# Patient Record
Sex: MALE | Race: WHITE | NOT HISPANIC OR LATINO | Employment: FULL TIME | URBAN - METROPOLITAN AREA
[De-identification: names, ages, dates, MRNs, and addresses within clinical notes are randomized per-mention and may not be internally consistent; named-entity substitution may affect disease eponyms.]

---

## 2017-05-19 ENCOUNTER — HOSPITAL ENCOUNTER (EMERGENCY)
Facility: CLINIC | Age: 22
Discharge: HOME | End: 2017-05-19
Attending: EMERGENCY MEDICINE | Admitting: EMERGENCY MEDICINE
Payer: COMMERCIAL

## 2017-05-19 ENCOUNTER — APPOINTMENT (OUTPATIENT)
Dept: CT IMAGING | Facility: CLINIC | Age: 22
End: 2017-05-19
Attending: EMERGENCY MEDICINE
Payer: COMMERCIAL

## 2017-05-19 VITALS
RESPIRATION RATE: 24 BRPM | DIASTOLIC BLOOD PRESSURE: 79 MMHG | OXYGEN SATURATION: 95 % | SYSTOLIC BLOOD PRESSURE: 138 MMHG | WEIGHT: 114.2 LBS | TEMPERATURE: 97.8 F

## 2017-05-19 DIAGNOSIS — E86.0 DEHYDRATION: ICD-10-CM

## 2017-05-19 DIAGNOSIS — R10.84 ABDOMINAL PAIN, GENERALIZED: ICD-10-CM

## 2017-05-19 DIAGNOSIS — R11.2 NAUSEA AND VOMITING, INTRACTABILITY OF VOMITING NOT SPECIFIED, UNSPECIFIED VOMITING TYPE: ICD-10-CM

## 2017-05-19 LAB
ALBUMIN SERPL-MCNC: 5.4 G/DL (ref 3.4–5)
ALP SERPL-CCNC: 99 U/L (ref 40–150)
ALT SERPL W P-5'-P-CCNC: 30 U/L (ref 0–70)
ANION GAP SERPL CALCULATED.3IONS-SCNC: 13 MMOL/L (ref 3–14)
AST SERPL W P-5'-P-CCNC: 17 U/L (ref 0–45)
BASOPHILS # BLD AUTO: 0.1 10E9/L (ref 0–0.2)
BASOPHILS NFR BLD AUTO: 0.2 %
BILIRUB SERPL-MCNC: 1.6 MG/DL (ref 0.2–1.3)
BUN SERPL-MCNC: 18 MG/DL (ref 7–30)
CALCIUM SERPL-MCNC: 10.3 MG/DL (ref 8.5–10.1)
CHLORIDE SERPL-SCNC: 105 MMOL/L (ref 94–109)
CO2 SERPL-SCNC: 22 MMOL/L (ref 20–32)
CREAT SERPL-MCNC: 0.85 MG/DL (ref 0.66–1.25)
DIFFERENTIAL METHOD BLD: ABNORMAL
EOSINOPHIL # BLD AUTO: 0 10E9/L (ref 0–0.7)
EOSINOPHIL NFR BLD AUTO: 0 %
ERYTHROCYTE [DISTWIDTH] IN BLOOD BY AUTOMATED COUNT: 14.1 % (ref 10–15)
GFR SERPL CREATININE-BSD FRML MDRD: ABNORMAL ML/MIN/1.7M2
GLUCOSE SERPL-MCNC: 180 MG/DL (ref 70–99)
HCT VFR BLD AUTO: 52.3 % (ref 40–53)
HGB BLD-MCNC: 18.2 G/DL (ref 13.3–17.7)
IMM GRANULOCYTES # BLD: 0.1 10E9/L (ref 0–0.4)
IMM GRANULOCYTES NFR BLD: 0.4 %
LIPASE SERPL-CCNC: 79 U/L (ref 73–393)
LYMPHOCYTES # BLD AUTO: 0.7 10E9/L (ref 0.8–5.3)
LYMPHOCYTES NFR BLD AUTO: 2.9 %
MCH RBC QN AUTO: 33.7 PG (ref 26.5–33)
MCHC RBC AUTO-ENTMCNC: 34.8 G/DL (ref 31.5–36.5)
MCV RBC AUTO: 97 FL (ref 78–100)
MONOCYTES # BLD AUTO: 0.9 10E9/L (ref 0–1.3)
MONOCYTES NFR BLD AUTO: 3.8 %
NEUTROPHILS # BLD AUTO: 21 10E9/L (ref 1.6–8.3)
NEUTROPHILS NFR BLD AUTO: 92.7 %
NRBC # BLD AUTO: 0 10*3/UL
NRBC BLD AUTO-RTO: 0 /100
PLATELET # BLD AUTO: 212 10E9/L (ref 150–450)
POTASSIUM SERPL-SCNC: 3.8 MMOL/L (ref 3.4–5.3)
PROT SERPL-MCNC: 9.3 G/DL (ref 6.8–8.8)
RBC # BLD AUTO: 5.4 10E12/L (ref 4.4–5.9)
SODIUM SERPL-SCNC: 140 MMOL/L (ref 133–144)
WBC # BLD AUTO: 22.6 10E9/L (ref 4–11)

## 2017-05-19 PROCEDURE — 96375 TX/PRO/DX INJ NEW DRUG ADDON: CPT

## 2017-05-19 PROCEDURE — 74177 CT ABD & PELVIS W/CONTRAST: CPT

## 2017-05-19 PROCEDURE — 96376 TX/PRO/DX INJ SAME DRUG ADON: CPT

## 2017-05-19 PROCEDURE — 80053 COMPREHEN METABOLIC PANEL: CPT | Performed by: EMERGENCY MEDICINE

## 2017-05-19 PROCEDURE — 25000128 H RX IP 250 OP 636: Performed by: EMERGENCY MEDICINE

## 2017-05-19 PROCEDURE — 85025 COMPLETE CBC W/AUTO DIFF WBC: CPT | Performed by: EMERGENCY MEDICINE

## 2017-05-19 PROCEDURE — 83690 ASSAY OF LIPASE: CPT | Performed by: EMERGENCY MEDICINE

## 2017-05-19 PROCEDURE — 96361 HYDRATE IV INFUSION ADD-ON: CPT

## 2017-05-19 PROCEDURE — 99284 EMERGENCY DEPT VISIT MOD MDM: CPT | Mod: 25

## 2017-05-19 PROCEDURE — 96374 THER/PROPH/DIAG INJ IV PUSH: CPT

## 2017-05-19 RX ORDER — ONDANSETRON 2 MG/ML
4 INJECTION INTRAMUSCULAR; INTRAVENOUS EVERY 30 MIN PRN
Status: DISCONTINUED | OUTPATIENT
Start: 2017-05-19 | End: 2017-05-19 | Stop reason: HOSPADM

## 2017-05-19 RX ORDER — ONDANSETRON 4 MG/1
4 TABLET, ORALLY DISINTEGRATING ORAL EVERY 6 HOURS PRN
Qty: 10 TABLET | Refills: 0 | Status: SHIPPED | OUTPATIENT
Start: 2017-05-19 | End: 2017-05-22

## 2017-05-19 RX ORDER — AMOXICILLIN 250 MG/1
250 CAPSULE ORAL DAILY
COMMUNITY

## 2017-05-19 RX ORDER — IOPAMIDOL 755 MG/ML
500 INJECTION, SOLUTION INTRAVASCULAR ONCE
Status: COMPLETED | OUTPATIENT
Start: 2017-05-19 | End: 2017-05-19

## 2017-05-19 RX ORDER — KETOROLAC TROMETHAMINE 30 MG/ML
30 INJECTION, SOLUTION INTRAMUSCULAR; INTRAVENOUS ONCE
Status: COMPLETED | OUTPATIENT
Start: 2017-05-19 | End: 2017-05-19

## 2017-05-19 RX ORDER — DIPHENHYDRAMINE HYDROCHLORIDE 50 MG/ML
25 INJECTION INTRAMUSCULAR; INTRAVENOUS ONCE
Status: COMPLETED | OUTPATIENT
Start: 2017-05-19 | End: 2017-05-19

## 2017-05-19 RX ORDER — SODIUM CHLORIDE 9 MG/ML
1000 INJECTION, SOLUTION INTRAVENOUS CONTINUOUS
Status: DISCONTINUED | OUTPATIENT
Start: 2017-05-19 | End: 2017-05-19 | Stop reason: HOSPADM

## 2017-05-19 RX ORDER — LIDOCAINE 40 MG/G
CREAM TOPICAL
Status: DISCONTINUED | OUTPATIENT
Start: 2017-05-19 | End: 2017-05-19

## 2017-05-19 RX ORDER — METOCLOPRAMIDE HYDROCHLORIDE 5 MG/ML
10 INJECTION INTRAMUSCULAR; INTRAVENOUS ONCE
Status: COMPLETED | OUTPATIENT
Start: 2017-05-19 | End: 2017-05-19

## 2017-05-19 RX ORDER — HYDROCODONE BITARTRATE AND ACETAMINOPHEN 5; 325 MG/1; MG/1
1-2 TABLET ORAL EVERY 4 HOURS PRN
Qty: 15 TABLET | Refills: 0 | Status: SHIPPED | OUTPATIENT
Start: 2017-05-19 | End: 2018-11-05

## 2017-05-19 RX ADMIN — METOCLOPRAMIDE 10 MG: 5 INJECTION, SOLUTION INTRAMUSCULAR; INTRAVENOUS at 04:27

## 2017-05-19 RX ADMIN — DIPHENHYDRAMINE HYDROCHLORIDE 25 MG: 50 INJECTION, SOLUTION INTRAMUSCULAR; INTRAVENOUS at 04:26

## 2017-05-19 RX ADMIN — ONDANSETRON 4 MG: 2 INJECTION INTRAMUSCULAR; INTRAVENOUS at 04:00

## 2017-05-19 RX ADMIN — SODIUM CHLORIDE 1000 ML: 9 INJECTION, SOLUTION INTRAVENOUS at 04:00

## 2017-05-19 RX ADMIN — IOPAMIDOL 58 ML: 755 INJECTION, SOLUTION INTRAVENOUS at 02:41

## 2017-05-19 RX ADMIN — KETOROLAC TROMETHAMINE 30 MG: 30 INJECTION, SOLUTION INTRAMUSCULAR at 02:25

## 2017-05-19 RX ADMIN — ONDANSETRON 4 MG: 2 INJECTION INTRAMUSCULAR; INTRAVENOUS at 01:58

## 2017-05-19 RX ADMIN — SODIUM CHLORIDE 55 ML: 9 INJECTION, SOLUTION INTRAVENOUS at 02:41

## 2017-05-19 RX ADMIN — SODIUM CHLORIDE 1000 ML: 9 INJECTION, SOLUTION INTRAVENOUS at 02:00

## 2017-05-19 ASSESSMENT — ENCOUNTER SYMPTOMS
BLOOD IN STOOL: 0
FEVER: 0
DIARRHEA: 0
HEADACHES: 0
VOMITING: 1
NAUSEA: 1
ABDOMINAL PAIN: 1

## 2017-05-19 NOTE — ED PROVIDER NOTES
"  History     Chief Complaint:  Nausea, Vomiting     HPI   Bassam Zuñiga is a 21 year old male who presents for evaluation of nausea and 10 episodes of non bloody emesis throughout the day today.  The patient reports he has had stomach problems for \"a long time,\" and notes he smokes marijuana daily to relieve his chronic nausea.  The patient reports last night he ate some [employer] and then went to a friend's house where he had a moderate amount of sugary alcohol.  He reports he woke up this morning feeling hungover, threw up once, and felt better.  He states in the late morning he smoked some marijuana which improved his nausea and he was able to eat some chicken strips.  He relays around noon he began feeling nauseated and since 1600 has had intractable vomiting.  He reports about 10 episodes of non bloody emesis which appeared like digested food or bile.  The patient denies diarrhea but reports one episode of non bloody loose stool just prior to arrival.  Here the patient is also acknowledging lower abdominal \"Churning\" pain.   He states he has been trying Gatorade, broth, water, and rest for his symptoms. He reports he was with good friends last night and denies possibility of being drugged.  He denies fever and headache.  He reports one similar episode of vomiting a few years ago when he had food poisoning.  He denies known sick contacts.   He states he is working on getting an EGD for his chronic nausea.    Allergies:  NKDA     Medications:    Aspirin     Past Medical History:    Congenital heart anomaly  Asplenia  Situs Inversus     Past Surgical History:    Open heart surgery x 3  Herniorrhaphy     Family History:  History reviewed.  No significant family history.     Social History:  Relationship status: Single  The patient reports he smokes marijuana almost daily  The patient reports alcohol use.   The patient presents with his father.    Review of Systems   Constitutional: Negative for fever. "   Gastrointestinal: Positive for abdominal pain, nausea and vomiting. Negative for blood in stool and diarrhea.   Neurological: Negative for headaches.   All other systems reviewed and are negative.      Physical Exam     Patient Vitals for the past 24 hrs:   BP Temp Temp src Heart Rate Resp SpO2 Weight   05/19/17 0445 138/79 - - - - 95 % -   05/19/17 0430 (!) 125/100 - - - - 97 % -   05/19/17 0415 125/76 - - - - - -   05/19/17 0400 (!) 121/92 - - - - 96 % -   05/19/17 0230 126/84 - - - - 95 % -   05/19/17 0215 103/60 - - - - 97 % -   05/19/17 0200 (!) 128/107 - - - - 97 % -   05/19/17 0155 132/87 - - - - 100 % -   05/19/17 0120 (!) 143/104 97.8  F (36.6  C) Oral 83 24 98 % 51.8 kg (114 lb 3.2 oz)       Physical Exam  Constitutional:  Appears well-developed and well-nourished.  Looks a bit pale.  Alert. Conversant, and polite, but dry heaving several times during HPI.  HENT:   Head: Atraumatic.   Nose: Nose normal.  Mouth/Throat: Oral mucosa is clear and moist. no trismus. Pharynx normal. Tonsils symmetric. No tonsillar enlargement, erythema, or exudate.  Eyes: Conjunctivae normal. EOM normal. Pupils equal, round, and reactive to light. No scleral icterus.   Neck: Normal range of motion. Neck supple. No tracheal deviation present.   Cardiovascular: Normal rate, regular rhythm. No gallop. No friction rub. No murmur heard. Symmetric radial artery pulses   Pulmonary/Chest: Effort normal. No stridor. No respiratory distress. No wheezes. No rales. No rhonchi . No tenderness.   Abdominal: Soft. Bowel sounds normal. No distension. No mass.  Diffuse tenderness with guarding, but no rebound.  Difficult to discern a point of maximum tenderness   Musculoskeletal: Normal range of motion. No edema. No tenderness. No deformity   Lymph: No cervical adenopathy.   Neurological: Alert and oriented to person, place, and time. Normal strength. CN II-VII intact. No sensory deficit. GCS eye subscore is 4. GCS verbal subscore is 5. GCS  motor subscore is 6. Normal coordination   Skin: Skin is warm and dry. No rash noted. No pallor. Normal capillary refill.  Psychiatric:  Normal mood. Normal affect allowing for nausea and pain    Emergency Department Course     Imaging:  Radiographic findings were communicated with the patient who voiced understanding of the findings.    CT Abdomen and Pelvis, with contrast, as per radiology:   IMPRESSION:  1. No acute cause of pain identified.  2. Abdominal situs inversus. Presumed associated asplenia unless the  spleen was surgically removed.  3. 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 No bowel obstruction or other acute findings.    Laboratory:  CBC: WBC 22.6 (H) HGB 18.2 (H)  (WNL)   CMP: Cr 0.85 (WNL) Glucose 180 (H) Calcium 10.3 (H) Bilirubin Total 1.6 (H) Albumin 5.4 (H) Protein Total 9.3 (H) Rest WNL  Lipase: 79 (WNL)     Interventions:  0158 Zofran, 4 mg, IV injection  0200 Normal Saline, 1000 mL, IV injection  0225 Toradol, 30 mg, IV injection  0400 Normal Saline, 1000 mL, IV injection  0400 Zofran, 4 mg, IV injection  0426 Benadryl, 25 mg, IV injection  0427 Reglan, 10 mg, IV injection     ED Course:  Nursing notes and past medical history reviewed.   I performed a physical examination of the patient as documented above.  I explained the plan with the patient who consents to this.   The patient underwent the workup as described above.   0402 Recheck and update.    0443 Recheck.   I personally reviewed the laboratory and imaging results with the Patient and father and answered all related questions prior to discharge.   Findings and plan explained to the Patient and father. Patient discharged home with instructions regarding supportive care, medications, and reasons to return. The importance of close follow-up was reviewed.     Impression & Plan      Medical Decision Making:  Bassam Zuñiga is a 21 year old male who presents to the emergency department today with  his father for evaluation of nausea with protracted vomiting starting around noon today associated with generalized abdominal pain.  He was out with his friends last night and consumed a moderate amount of alcohol but not enough that would typically trigger hangover symptoms for him.  He also is a daily marijuana user which raises concern for cannabis hyperemesis syndrome, although he has no prior diagnosis of that.     He was diffusely tender on exam.  Labs were not reassuring with an elevated white count.  Concern was also for intraabdominal infection such as appendicitis, colitis, diverticulitis, bowel obstruction.  Fortunately CT scan is negative.      Lab workup shows no evidence for electrolyte disturbance, pancreatitis, hepatitis.  The patient did not provide urine while here in the ER.    He was quite uncomfortable and nauseated upon arrival but now feels better after medications.  He is requesting to go home, even though he is still curled up on his bed.  I offered him admission but he declines.  My concern here is that given the severity of his symptoms he is likely not going to do well at home and is at high risk for return to the ER within a few hours.  However, the patient wants to go home and try it outpatient.  His father is agreeable to this plan.  We will send him home with prescriptions for Norco and Zofran.  Abdominal pain precautions reviewed.      Diagnosis:    ICD-10-CM   1. Abdominal pain, generalized R10.84   2. Nausea and vomiting, intractability of vomiting not specified, unspecified vomiting type R11.2   3. Dehydration E86.0       Disposition:   Discharge to home with primary care follow up.     Discharge Medications:   Hydrocodone-acetaminophen (NORCO) 5-325 MG per tablet Take 1-2 tablets by mouth every 4 hours as needed for moderate to severe pain, Disp-15 tablet, R-0, Local Print      ondansetron (ZOFRAN ODT) 4 MG ODT tab Take 1 tablet (4 mg) by mouth every 6 hours as needed for nausea,  Disp-10 tablet, R-0, Local Print         I, Gonzalo Sigala, am serving as a scribe on 5/19/2017 at 1:57 AM to personally document services performed by Lee Saunders MD, based on my observations and the provider's statements to me.       Lee Saunders MD  05/19/17 0744

## 2017-05-19 NOTE — ED AVS SNAPSHOT
[facility] Emergency Department    [address]    [facility] [address]    Phone:  [phone]    Fax:  [phone]                                       Bassam Zuñiga   MRN: [id number]    Department:  [facility] Emergency Department   Date of Visit:  5/19/2017           Patient Information     Date Of Birth          1995        Your diagnoses for this visit were:     Abdominal pain, generalized     Nausea and vomiting, intractability of vomiting not specified, unspecified vomiting type     Dehydration        You were seen by Lee Saunders MD.      Follow-up Information     Follow up with Dony Cochran MD In 1 day.    Specialty:  Family Practice    Why:  or return to the ER right away    Contact information:    [employer]  [address]  [facility] [address]  [phone]          Discharge Instructions       Discharge Instructions  Abdominal Pain    Abdominal pain can be caused by many things. Your evaluation today does not show the exact cause for your pain. Your doctor today has decided that it is unlikely your pain is due to a life threatening problem, or a problem requiring surgery or hospital admission. Sometimes those problems cannot be found right away, so it is very important that you follow up as directed.  Sometimes only the changes which occur over time allow the cause of your pain to be found.    Return to the Emergency Department for a recheck in 8-12 hours if your pain continues.  If your pain gets worse, changes in location, or feels different, return to the Emergency Department right away.    ADULTS:  Return to the Emergency Department right away if:      You get an oral temperature above 102oF or as directed by your doctor.    You have blood in your stools (bright red or black, tarry stools).    You keep throwing up or can t drink liquids.    You see blood when you throw up.    You can t have a bowel movement or you can t pass  gas.    Your stomach gets bloated or bigger.    Your skin or the whites of your eyes look yellow.    You faint.    You have bloody, frequent or painful urination.    You have new symptoms or anything that worries you.    CHILDREN:  Return to the Emergency Department right away if your child has any of the above-listed symptoms or the following:      Pushes your hand away or screams/cries when his/her belly is touched.    You notice your child is very fussy or weak.    Your child is very tired and is too tired to eat or drink.    Your child is dehydrated.  Signs of dehydration can be:  o Your infant has had no wet diapers in 4-5 hours.  o Your older child has not passed urine in 6-8 hours.  o Your infant or child starts to have dry mouth and lips, or no saliva or tears.    PREGNANT WOMEN:  Return to the Emergency Department right away if you have any of the above-listed symptoms or the following:      You have bleeding, leaking fluid or passing tissue from the vagina.    You have worse pain or cramping, or pain in your shoulder or back.    You have vomiting that will not stop.    You have painful or bloody urination.    You have a temperature of 100oF or more.    Your baby is not moving as much as usual.    You faint.    You get a bad headache with or without eye problems and abdominal pain.    You have a convulsion or seizure.    You have unusual discharge from your vagina and abdominal pain.    Abdominal pain is pretty common during pregnancy.  Your pain may or may not be related to your pregnancy. You should follow-up closely with your OB doctor so they can evaluate you and your baby.  Until you follow-up with your regular doctor, do the following:       Avoid sex and do not put anything in your vagina.    Drink clear fluids.    Only take medications approved by your doctor.    MORE INFORMATION:    Appendicitis:  A possible cause of abdominal pain in any person who still has their appendix is acute appendicitis.  "Appendicitis is often hard to diagnose.  Testing does not always rule out early appendicitis or other causes of abdominal pain. Close follow-up with your doctor and re-evaluations may be needed to figure out the reason for your abdominal pain.    Follow-up:  It is very important that you make an appointment with your clinic and go to the appointment.  If you do not follow-up with your primary doctor, it may result in missing an important development which could result in permanent injury or disability and/or lasting pain.  If there is any problem keeping your appointment, call your doctor or return to the Emergency Department.    Medications:  Take your medications as directed by your doctor today.  Before using over-the-counter medications, ask your doctor and make sure to take the medications as directed.  If you have any questions about medications, ask your doctor.    Diet:  Resume your normal diet as much as possible, but do not eat fried, fatty or spicy foods while you have pain.  Do not drink alcohol or have caffeine.  Do not smoke tobacco.    Probiotics: If you have been given an antibiotic, you may want to also take a probiotic pill or eat yogurt with live cultures. Probiotics have \"good bacteria\" to help your intestines stay healthy. Studies have shown that probiotics help prevent diarrhea and other intestine problems (including C. diff infection) when you take antibiotics. You can buy these without a prescription in the pharmacy section of the store.     If you were given a prescription for medicine here today, be sure to read all of the information (including the package insert) that comes with your prescription.  This will include important information about the medicine, its side effects, and any warnings that you need to know about.  The pharmacist who fills the prescription can provide more information and answer questions you may have about the medicine.  If you have questions or concerns that the " pharmacist cannot address, please call or return to the Emergency Department.         Opioid Medication Information    Pain medications are among the most commonly prescribed medicines, so we are including this information for all our patients. If you did not receive pain medication or get a prescription for pain medicine, you can ignore it.     You may have been given a prescription for an opioid (narcotic) pain medicine and/or have received a pain medicine while here in the Emergency Department. These medicines can make you drowsy or impaired. You must not drive, operate dangerous equipment, or engage in any other dangerous activities while taking these medications. If you drive while taking these medications, you could be arrested for DUI, or driving under the influence. Do not drink any alcohol while you are taking these medications.     Opioid pain medications can cause addiction. If you have a history of chemical dependency of any type, you are at a higher risk of becoming addicted to pain medications.  Only take these prescribed medications to treat your pain when all other options have been tried. Take it for as short a time and as few doses as possible. Store your pain pills in a secure place, as they are frequently stolen and provide a dangerous opportunity for children or visitors in your house to start abusing these powerful medications. We will not replace any lost or stolen medicine.  As soon as your pain is better, you should flush all your remaining medication.     Many prescription pain medications contain Tylenol  (acetaminophen), including Vicodin , Tylenol #3 , Norco , Lortab , and Percocet .  You should not take any extra pills of Tylenol  if you are using these prescription medications or you can get very sick.  Do not ever take more than 3000 mg of acetaminophen in any 24 hour period.    All opioids tend to cause constipation. Drink plenty of water and eat foods that have a lot of fiber, such  as fruits, vegetables, prune juice, apple juice and high fiber cereal.  Take a laxative if you don t move your bowels at least every other day. Miralax , Milk of Magnesia, Colace , or Senna  can be used to keep you regular.      Remember that you can always come back to the Emergency Department if you are not able to see your regular doctor in the amount of time listed above, if you get any new symptoms, or if there is anything that worries you.          24 Hour Appointment Hotline       To make an appointment at any [facility], call [phone] ([phone]). If you don't have a family doctor or clinic, we will help you find one. [facility] clinics are conveniently located to serve the needs of you and your family.             Review of your medicines      START taking        Dose / Directions Last dose taken    HYDROcodone-acetaminophen 5-325 MG per tablet   Commonly known as:  NORCO   Dose:  1-2 tablet   Quantity:  15 tablet        Take 1-2 tablets by mouth every 4 hours as needed for moderate to severe pain   Refills:  0        ondansetron 4 MG ODT tab   Commonly known as:  ZOFRAN ODT   Dose:  4 mg   Quantity:  10 tablet        Take 1 tablet (4 mg) by mouth every 6 hours as needed for nausea   Refills:  0          Our records show that you are taking the medicines listed below. If these are incorrect, please call your family doctor or clinic.        Dose / Directions Last dose taken    AMOXICILLIN PO   Dose:  250 mg        Take 250 mg by mouth   Refills:  0        ASPIRIN PO   Dose:  81 mg        Take 81 mg by mouth   Refills:  0                Prescriptions were sent or printed at these locations (2 Prescriptions)                   Other Prescriptions                Printed at Department/Unit printer (2 of 2)         HYDROcodone-acetaminophen (NORCO) 5-325 MG per tablet               ondansetron (ZOFRAN ODT) 4 MG ODT tab                Procedures and tests performed during your visit     CBC with  platelets differential    CT Abdomen Pelvis w Contrast    Comprehensive metabolic panel    Lipase    Peripheral IV catheter      Orders Needing Specimen Collection     None      Pending Results     No orders found from 5/17/2017 to 5/20/2017.            Pending Culture Results     No orders found from 5/17/2017 to 5/20/2017.            Pending Results Instructions     If you had any lab results that were not finalized at the time of your Discharge, you can call the ED Lab Result RN at [phone]. You will be contacted by this team for any positive Lab results or changes in treatment. The nurses are available 7 days a week from 10A to 6:30P.  You can leave a message 24 hours per day and they will return your call.        Test Results From Your Hospital Stay        5/19/2017  2:10 AM      Component Results     Component Value Ref Range & Units Status    WBC 22.6 (H) 4.0 - 11.0 10e9/L Final    RBC Count 5.40 4.4 - 5.9 10e12/L Final    Hemoglobin 18.2 (H) 13.3 - 17.7 g/dL Final    Hematocrit 52.3 40.0 - 53.0 % Final    MCV 97 78 - 100 fl Final    MCH 33.7 (H) 26.5 - 33.0 pg Final    MCHC 34.8 31.5 - 36.5 g/dL Final    RDW 14.1 10.0 - 15.0 % Final    Platelet Count 212 150 - 450 10e9/L Final    Diff Method Automated Method  Final    % Neutrophils 92.7 % Final    % Lymphocytes 2.9 % Final    % Monocytes 3.8 % Final    % Eosinophils 0.0 % Final    % Basophils 0.2 % Final    % Immature Granulocytes 0.4 % Final    Nucleated RBCs 0 0 /100 Final    Absolute Neutrophil 21.0 (H) 1.6 - 8.3 10e9/L Final    Absolute Lymphocytes 0.7 (L) 0.8 - 5.3 10e9/L Final    Absolute Monocytes 0.9 0.0 - 1.3 10e9/L Final    Absolute Eosinophils 0.0 0.0 - 0.7 10e9/L Final    Absolute Basophils 0.1 0.0 - 0.2 10e9/L Final    Abs Immature Granulocytes 0.1 0 - 0.4 10e9/L Final    Absolute Nucleated RBC 0.0  Final         5/19/2017  2:24 AM      Component Results     Component Value Ref Range & Units Status    Sodium 140 133 - 144 mmol/L Final     Potassium 3.8 3.4 - 5.3 mmol/L Final    Chloride 105 94 - 109 mmol/L Final    Carbon Dioxide 22 20 - 32 mmol/L Final    Anion Gap 13 3 - 14 mmol/L Final    Glucose 180 (H) 70 - 99 mg/dL Final    Urea Nitrogen 18 7 - 30 mg/dL Final    Creatinine 0.85 0.66 - 1.25 mg/dL Final    GFR Estimate >90  Non  GFR Calc   >60 mL/min/1.7m2 Final    GFR Estimate If Black >90   GFR Calc   >60 mL/min/1.7m2 Final    Calcium 10.3 (H) 8.5 - 10.1 mg/dL Final    Bilirubin Total 1.6 (H) 0.2 - 1.3 mg/dL Final    Albumin 5.4 (H) 3.4 - 5.0 g/dL Final    Protein Total 9.3 (H) 6.8 - 8.8 g/dL Final    Alkaline Phosphatase 99 40 - 150 U/L Final    ALT 30 0 - 70 U/L Final    AST 17 0 - 45 U/L Final         5/19/2017  2:24 AM      Component Results     Component Value Ref Range & Units Status    Lipase 79 73 - 393 U/L Final         5/19/2017  3:17 AM      Narrative     CT ABDOMEN PELVIS W CONTRAST  5/19/2017 2:48 AM     HISTORY: Abdominal pain, vomiting.    TECHNIQUE: Volumetric acquisition through abdomen and pelvis with I.V.  contrast. 58 mL Isovue-370. Radiation dose for this scan was reduced  using automated exposure control, adjustment of the mA and/or kV  according to patient size, or iterative reconstruction technique.    COMPARISON: None.    FINDINGS: Situs inversus. Prominent liver. Tiny nonspecific enhancing  focus in the upper right aspect of the liver which is of doubtful  clinical significance. The spleen is not seen. The stomach is on the  right and the IVC is on the left. Pancreas, adrenal glands, and  kidneys demonstrate no worrisome findings. Two tiny probable cysts in  the left kidney and one in the right, too small to accurately  characterize. No suspicious renal masses or hydronephrosis.    No suspicious pelvic masses. 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  Mild asymmetric prominence of the left external iliac vein.  No  ascites, bowel obstruction or free air. No other acute findings.        Impression     IMPRESSION:  1. No acute cause of pain identified.  2. Abdominal situs inversus. Presumed associated asplenia unless the  spleen was surgically removed.  3. 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 No bowel obstruction or other acute findings.    CHAYO DENNEY MD                Clinical Quality Measure: Blood Pressure Screening     Your blood pressure was checked while you were in the emergency department today. The last reading we obtained was  BP: 138/79 . Please read the guidelines below about what these numbers mean and what you should do about them.  If your systolic blood pressure (the top number) is less than 120 and your diastolic blood pressure (the bottom number) is less than 80, then your blood pressure is normal. There is nothing more that you need to do about it.  If your systolic blood pressure (the top number) is 120-139 or your diastolic blood pressure (the bottom number) is 80-89, your blood pressure may be higher than it should be. You should have your blood pressure rechecked within a year by a primary care provider.  If your systolic blood pressure (the top number) is 140 or greater or your diastolic blood pressure (the bottom number) is 90 or greater, you may have high blood pressure. High blood pressure is treatable, but if left untreated over time it can put you at risk for heart attack, stroke, or kidney failure. You should have your blood pressure rechecked by a primary care provider within the next 4 weeks.  If your provider in the emergency department today gave you specific instructions to follow-up with your doctor or provider even sooner than that, you should follow that instruction and not wait for up to 4 weeks for your follow-up visit.        Thank you for choosing [facility]       Thank you for choosing [facility] for your care. Our goal is always to  "provide you with excellent care. Hearing back from our patients is one way we can continue to improve our services. Please take a few minutes to complete the written survey that you may receive in the mail after you visit with us. Thank you!        [employer] Information     [employer] lets you send messages to your doctor, view your test results, renew your prescriptions, schedule appointments and more. To sign up, go to [email or web address] . Click on \"Log in\" on the left side of the screen, which will take you to the Welcome page. Then click on \"Sign up Now\" on the right side of the page.     You will be asked to enter the access code listed below, as well as some personal information. Please follow the directions to create your username and password.     Your access code is: [email or web address]  Expires: 2017  4:51 AM     Your access code will  in 90 days. If you need help or a new code, please call your [facility] clinic or [phone].        Care EveryWhere ID     This is your Care EveryWhere ID. This could be used by other organizations to access your [facility] medical records  [id number]        After Visit Summary       This is your record. Keep this with you and show to your community pharmacist(s) and doctor(s) at your next visit.                  "

## 2017-05-19 NOTE — ED NOTES
IN TRIAGE airway,breathing and circulation intact, without need for intervention . Alert and interacting appropriately for age and situation. Smokes 2 gms of thc daily now here for vomiting

## 2017-05-19 NOTE — ED AVS SNAPSHOT
[facility] Emergency Department    [address]    [facility] [address]    Phone:  [phone]    Fax:  [phone]                                       Bassam Zuñiga   MRN: [id number]    Department:  [facility] Emergency Department   Date of Visit:  5/19/2017           After Visit Summary Signature Page     I have received my discharge instructions, and my questions have been answered. I have discussed any challenges I see with this plan with the nurse or doctor.    ..........................................................................................................................................  Patient/Patient Representative Signature      ..........................................................................................................................................  Patient Representative Print Name and Relationship to Patient    ..................................................               ................................................  Date                                            Time    ..........................................................................................................................................  Reviewed by Signature/Title    ...................................................              ..............................................  Date                                                            Time

## 2017-05-19 NOTE — ED NOTES
"Patient states that he consumed ETOH last night which \"had a lot of sugar in it\" and woke up feeling ill this morning.   "

## 2018-11-05 ENCOUNTER — HOSPITAL ENCOUNTER (EMERGENCY)
Facility: CLINIC | Age: 23
Discharge: HOME | End: 2018-11-06
Attending: EMERGENCY MEDICINE | Admitting: EMERGENCY MEDICINE
Payer: COMMERCIAL

## 2018-11-05 DIAGNOSIS — R00.2 PALPITATIONS: ICD-10-CM

## 2018-11-05 PROCEDURE — 99285 EMERGENCY DEPT VISIT HI MDM: CPT | Mod: 25

## 2018-11-05 PROCEDURE — 93005 ELECTROCARDIOGRAM TRACING: CPT

## 2018-11-05 NOTE — ED AVS SNAPSHOT
" [facility] Emergency Department    [address]    [facility] [address]    Phone:  [phone]    Fax:  [phone]                                       Bassam Zuñiga   MRN: [id number]    Department:  [facility] Emergency Department   Date of Visit:  11/5/2018           Patient Information     Date Of Birth          1995        Your diagnoses for this visit were:     Palpitations        You were seen by Joshua Golden MD.      Follow-up Information     Follow up with Dony Cochran MD. Schedule an appointment as soon as possible for a visit in 2 days.    Specialty:  Family Practice    Contact information:    [facility]  [address]  [facility] [address] [phone]          Follow up with [facility] Emergency Department.    Specialty:  EMERGENCY MEDICINE    Why:  If symptoms worsen    Contact information:    [address]  [facility] [id number] [phone]        Discharge Instructions         Heart Palpitations    Palpitations are the feeling that your heart is beating hard, fast, or irregular. Some describe it as \"pounding\" or \"skipped beats.\" Palpitations may occur in someone with heart disease, but can also occur in a healthy person.  Heart-related causes:    Arrhythmia (a change from the heart's normal rhythm)    Heart valve disease    Disease of the heart muscle    Coronary artery disease    High blood pressure  Non-heart-related causes:    Certain medicines such as asthma inhalers and decongestants    Some herbal supplements, energy drinks and pills, and weight loss pills    Illegal stimulant drugs such as cocaine, crank, methamphetamine, PCP, bath salts, or ecstasy    Caffeine, alcohol, and tobacco    Medical conditions such as thyroid disease, anemia, anxiety, and panic disorder  Sometimes the cause can't be found.  Home care  Follow these home care tips:    Don't use too much caffeine, alcohol, tobacco, or any " stimulant drugs.    Tell your doctor about any prescription or over-the-counter or herbal medicines you take.  Follow-up care    Follow up with your doctor, or as advised.  Call 911  This is the fastest and safest way to get to the emergency department. The paramedics can also begin treatment on the way to the hospital, if needed.  Don't wait until your symptoms are severe to call 911. These are reasons to call 911:    Chest pain    Shortness of breath    Feeling lightheaded, faint, or dizzy    Fainting or loss of consciousness    Very irregular heartbeat    Rapid heartbeat that makes you uncomfortable    Slower than usual heart rate associated with symptoms    Slower than usual heart rate    Chest pain with weakness, dizziness, heavy sweating, nausea, or vomiting    Extreme drowsiness or confusion    Weakness of an arm or leg, or on 1 side of the face    Difficulty with speech or vision  When to seek medical advice  Call your healthcare provider right away if you have palpitations and any of the following:    Weakness    Dizziness    Lightheadedness    Fainting  Date Last Reviewed: 4/27/2016 2000-2018 [employer]. [address] [place] [address]. All rights reserved. This information is not intended as a substitute for professional medical care. Always follow your healthcare professional's instructions.          24 Hour Appointment Hotline       To make an appointment at any [facility] clinic, call [phone] ([phone]). If you don't have a family doctor or clinic, we will help you find one. [facility] clinics are conveniently located to serve the needs of you and your family.          ED Discharge Orders     Holter Monitor 24 hour - Adult                    Review of your medicines      Our records show that you are taking the medicines listed below. If these are incorrect, please call your family doctor or clinic.        Dose / Directions Last dose taken    AMOXICILLIN PO   Dose:   250 mg        Take 250 mg by mouth   Refills:  0        ASPIRIN PO   Dose:  81 mg        Take 81 mg by mouth   Refills:  0                Procedures and tests performed during your visit     Basic metabolic panel (BMP)    CBC (platelets, no diff)    Chest XR,  PA & LAT    EKG 12 lead    TSH with free T4 reflex      Orders Needing Specimen Collection     None      Pending Results     Date and Time Order Name Status Description    11/5/2018 2359 Chest XR,  PA & LAT Preliminary     11/5/2018 2329 EKG 12 lead Preliminary             Pending Culture Results     No orders found for last 3 day(s).            Pending Results Instructions     If you had any lab results that were not finalized at the time of your Discharge, you can call the ED Lab Result RN at [phone]. You will be contacted by this team for any positive Lab results or changes in treatment. The nurses are available 7 days a week from 10A to 6:30P.  You can leave a message 24 hours per day and they will return your call.        Test Results From Your Hospital Stay        11/6/2018 12:26 AM      Component Results     Component Value Ref Range & Units Status    WBC 5.4 4.0 - 11.0 10e9/L Final    RBC Count 5.39 4.4 - 5.9 10e12/L Final    Hemoglobin 18.5 (H) 13.3 - 17.7 g/dL Final    Hematocrit 52.8 40.0 - 53.0 % Final    MCV 98 78 - 100 fl Final    MCH 34.3 (H) 26.5 - 33.0 pg Final    MCHC 35.0 31.5 - 36.5 g/dL Final    RDW 14.1 10.0 - 15.0 % Final    Platelet Count 191 150 - 450 10e9/L Final         11/6/2018 12:41 AM      Component Results     Component Value Ref Range & Units Status    Sodium 136 133 - 144 mmol/L Final    Potassium 3.9 3.4 - 5.3 mmol/L Final    Chloride 104 94 - 109 mmol/L Final    Carbon Dioxide 23 20 - 32 mmol/L Final    Anion Gap 9 3 - 14 mmol/L Final    Glucose 97 70 - 99 mg/dL Final    Urea Nitrogen 21 7 - 30 mg/dL Final    Creatinine 0.98 0.66 - 1.25 mg/dL Final    GFR Estimate >90 >60 mL/min/1.7m2 Final    Non African American GFR  Calc    GFR Estimate If Black >90 >60 mL/min/1.7m2 Final    African American GFR Calc    Calcium 9.2 8.5 - 10.1 mg/dL Final         11/6/2018 12:45 AM      Component Results     Component Value Ref Range & Units Status    TSH 3.88 0.40 - 4.00 mU/L Final         11/6/2018 12:49 AM      Narrative     CHEST 2 VIEWS  11/6/2018 12:21 AM     HISTORY: Palpitations. Shortness of breath. Congenital heart defect.  Dextrocardia.    COMPARISON: None.    FINDINGS: The lungs are clear. Small cardiac silhouette. Prior median  sternotomy.        Impression     IMPRESSION:  1. No evidence of active cardiopulmonary disease.  2. Small cardiac silhouette, likely relating to the congenital heart  disease described in the clinical history.                Clinical Quality Measure: Blood Pressure Screening     Your blood pressure was checked while you were in the emergency department today. The last reading we obtained was  BP: (!) 137/95 . Please read the guidelines below about what these numbers mean and what you should do about them.  If your systolic blood pressure (the top number) is less than 120 and your diastolic blood pressure (the bottom number) is less than 80, then your blood pressure is normal. There is nothing more that you need to do about it.  If your systolic blood pressure (the top number) is 120-139 or your diastolic blood pressure (the bottom number) is 80-89, your blood pressure may be higher than it should be. You should have your blood pressure rechecked within a year by a primary care provider.  If your systolic blood pressure (the top number) is 140 or greater or your diastolic blood pressure (the bottom number) is 90 or greater, you may have high blood pressure. High blood pressure is treatable, but if left untreated over time it can put you at risk for heart attack, stroke, or kidney failure. You should have your blood pressure rechecked by a primary care provider within the next 4 weeks.  If your provider in the  "emergency department today gave you specific instructions to follow-up with your doctor or provider even sooner than that, you should follow that instruction and not wait for up to 4 weeks for your follow-up visit.        Thank you for choosing [facility]       Thank you for choosing [facility] for your care. Our goal is always to provide you with excellent care. Hearing back from our patients is one way we can continue to improve our services. Please take a few minutes to complete the written survey that you may receive in the mail after you visit with us. Thank you!        [employer] Information     [employer] lets you send messages to your doctor, view your test results, renew your prescriptions, schedule appointments and more. To sign up, go to [email or web address] . Click on \"Log in\" on the left side of the screen, which will take you to the Welcome page. Then click on \"Sign up Now\" on the right side of the page.     You will be asked to enter the access code listed below, as well as some personal information. Please follow the directions to create your username and password.     Your access code is: [email or web address]  Expires: 2019  1:24 AM     Your access code will  in 90 days. If you need help or a new code, please call your [facility] clinic or [phone].        Care EveryWhere ID     This is your Care EveryWhere ID. This could be used by other organizations to access your [facility] medical records  [id number]        Equal Access to Services     BELA CHIU : Hadii charo huango Somagnolia, waaxda luqadaha, qaybta kaalmada adeegyada, pina vasquez . So [facility] [phone].    ATENCIÓN: Si habla español, tiene a bryson disposición servicios gratuitos de asistencia lingüística. Whit al [phone].    We comply with applicable federal civil rights laws and [place] laws. We do not discriminate on the basis of race, color, national origin, age, disability, sex, sexual orientation, or " gender identity.            After Visit Summary       This is your record. Keep this with you and show to your community pharmacist(s) and doctor(s) at your next visit.

## 2018-11-05 NOTE — ED AVS SNAPSHOT
[facility] Emergency Department    [address]    [facility] [address]    Phone:  [phone]    Fax:  [phone]                                       Bassam Zuñiga   MRN: [id number]    Department:  [facility] Emergency Department   Date of Visit:  11/5/2018           After Visit Summary Signature Page     I have received my discharge instructions, and my questions have been answered. I have discussed any challenges I see with this plan with the nurse or doctor.    ..........................................................................................................................................  Patient/Patient Representative Signature      ..........................................................................................................................................  Patient Representative Print Name and Relationship to Patient    ..................................................               ................................................  Date                                   Time    ..........................................................................................................................................  Reviewed by Signature/Title    ...................................................              ..............................................  Date                                               Time          22EPIC Rev 08/18

## 2018-11-06 ENCOUNTER — APPOINTMENT (OUTPATIENT)
Dept: GENERAL RADIOLOGY | Facility: CLINIC | Age: 23
End: 2018-11-06
Attending: EMERGENCY MEDICINE
Payer: COMMERCIAL

## 2018-11-06 VITALS
OXYGEN SATURATION: 94 % | SYSTOLIC BLOOD PRESSURE: 137 MMHG | WEIGHT: 125 LBS | TEMPERATURE: 98 F | RESPIRATION RATE: 15 BRPM | HEART RATE: 98 BPM | DIASTOLIC BLOOD PRESSURE: 95 MMHG

## 2018-11-06 LAB
ANION GAP SERPL CALCULATED.3IONS-SCNC: 9 MMOL/L (ref 3–14)
BUN SERPL-MCNC: 21 MG/DL (ref 7–30)
CALCIUM SERPL-MCNC: 9.2 MG/DL (ref 8.5–10.1)
CHLORIDE SERPL-SCNC: 104 MMOL/L (ref 94–109)
CO2 SERPL-SCNC: 23 MMOL/L (ref 20–32)
CREAT SERPL-MCNC: 0.98 MG/DL (ref 0.66–1.25)
ERYTHROCYTE [DISTWIDTH] IN BLOOD BY AUTOMATED COUNT: 14.1 % (ref 10–15)
GFR SERPL CREATININE-BSD FRML MDRD: >90 ML/MIN/1.7M2
GLUCOSE SERPL-MCNC: 97 MG/DL (ref 70–99)
HCT VFR BLD AUTO: 52.8 % (ref 40–53)
HGB BLD-MCNC: 18.5 G/DL (ref 13.3–17.7)
INTERPRETATION ECG - MUSE: NORMAL
MCH RBC QN AUTO: 34.3 PG (ref 26.5–33)
MCHC RBC AUTO-ENTMCNC: 35 G/DL (ref 31.5–36.5)
MCV RBC AUTO: 98 FL (ref 78–100)
PLATELET # BLD AUTO: 191 10E9/L (ref 150–450)
POTASSIUM SERPL-SCNC: 3.9 MMOL/L (ref 3.4–5.3)
RBC # BLD AUTO: 5.39 10E12/L (ref 4.4–5.9)
SODIUM SERPL-SCNC: 136 MMOL/L (ref 133–144)
TSH SERPL DL<=0.005 MIU/L-ACNC: 3.88 MU/L (ref 0.4–4)
WBC # BLD AUTO: 5.4 10E9/L (ref 4–11)

## 2018-11-06 PROCEDURE — 71046 X-RAY EXAM CHEST 2 VIEWS: CPT

## 2018-11-06 PROCEDURE — 84443 ASSAY THYROID STIM HORMONE: CPT | Performed by: EMERGENCY MEDICINE

## 2018-11-06 PROCEDURE — 85027 COMPLETE CBC AUTOMATED: CPT | Performed by: EMERGENCY MEDICINE

## 2018-11-06 PROCEDURE — 80048 BASIC METABOLIC PNL TOTAL CA: CPT | Performed by: EMERGENCY MEDICINE

## 2018-11-06 ASSESSMENT — ENCOUNTER SYMPTOMS
ACTIVITY CHANGE: 0
PALPITATIONS: 1
DIZZINESS: 0
SHORTNESS OF BREATH: 0
LIGHT-HEADEDNESS: 0

## 2018-11-06 NOTE — ED PROVIDER NOTES
History     Chief Complaint:  Palpitations    HPI   Bassam Zuñiga is a 23 year old male with a congenital heart anomaly who presents to the emergency department for evaluation of palpitations. He reports he started feeling them yesterday, but they were faint enough that he could ignore them. Today, he noticed the irregularities were much more prominent which worried him. He characterizes the sensation as a normal heart rate with a sudden, strong beat every once in awhile. This continued intermittently throughout the day, and his father called the cardiology clinic for advice. A clinic nurse told him to just continue monitoring the beats before presenting anywhere. Then, 30 minutes before presenting to the ED, he reports experiencing 4 consecutive strong beats, and that is what prompted him to present to the ED for evaluation. Here, he reports slight exertional shortness of breath and a heated sensation on the left side of his chest. He denies any chest pressure, dizziness, lightheadedness, syncope, or changes to his diet or daily routine of late. He does report being under more stress recently, and consumed more coffee than usual yesterday which is when these beats started. He reports undergoing 3 heart surgeries by the time he was 2 years old for his congenital heart anomaly, but has not had any issues since. He has a yearly follow up appointment with Dr. Vasquez, cardiology, and reports that he was taken off coumadin and lasix last year. He denies any history of premature sudden cardiac death or a history of thyroid problems. He uses marijuana and alcohol in social settings, and denies any tobacco use. He did have 2 beers over the weekend.    ECHO from 4/5/2018:  INTERPRETATION:  This is an echocardiogram on a patient with unbalanced AV canal, status   post Fontan repair.  He has known dextrocardia.    Allergies:  NKDA    Medications:    Amoxicillin  Aspirin    Past Medical History:    Congenital heart  anomaly    Past Surgical History:    Cardiac surgery x3  Hernia repair    Family History:    No past pertinent family history.    Social History:  Marital Status:  Single [1]  Presents with father.   Negative for tobacco use.  Positive for social alcohol use.      Review of Systems   Constitutional: Negative for activity change.   Respiratory: Negative for shortness of breath.    Cardiovascular: Positive for palpitations. Negative for chest pain.   Neurological: Negative for dizziness, syncope and light-headedness.   All other systems reviewed and are negative.    Physical Exam     Patient Vitals for the past 24 hrs:   BP Temp Temp src Pulse Heart Rate Resp SpO2 Weight   11/06/18 0100 - - - - 80 15 94 % -   11/05/18 2328 (!) 137/95 98  F (36.7  C) Temporal 98 98 18 99 % 56.7 kg (125 lb)     Physical Exam  General:                        Well-nourished                        Speaking in full sentences  Eyes:                        Conjunctiva without injection or scleral icterus  ENT:                        Moist mucous membranes                        Nares patent                        Pinnae normal  Neck:                        Full ROM                        No stiffness appreciated                        No thyromegaly  Resp:                        Lungs CTAB                        No crackles, wheezing or audible rubs                        Good air movement  CV:                                        Normal rate, regular rhythm                        S1 and S2 present                        No murmur, gallop or rub                        Well healed midline surgical scar  GI:                        BS present                        Abdomen soft without distention                        Non-tender to light and deep palpation                        No guarding or rebound tenderness  Skin:                        Warm, dry, well perfused                        No rashes or open wounds on exposed skin  MSK:                         Moves all extremities                        No focal deformities or swelling  Neuro:                        Alert                        Answers questions appropriately                        Moves all extremities equally                        Gait stable  Psych:                        Normal affect, normal mood    Emergency Department Course   ECG:  Indication: Palpitations  Time: 2341  Vent. Rate 80 bpm. IL interval 152. QRS duration 94. QT/QTc 362/417. P-R-T axis 105 259 87.  Suspect arm lead reversal, interpretation assumes no reversal. Normal sinus rhythm. Right superior axis deviation. Pulmonary disease pattern. Incomplete right BBB. Right ventricular hypertrophy. Abnormal ECG. Read time: 2346.    Imaging:  Radiographic findings were communicated with the patient who voiced understanding of the findings.    XR Chest PA & LAT:   1. No evidence of active cardiopulmonary disease.  2. Small cardiac silhouette, likely relating to the congenital heart  disease described in the clinical history, as per radiology.    Laboratory:  CBC: WBC: 5.4, HGB: 18.5 (H), PLT: 191  BMP: All WNL (Creatinine: 0.98)    TSH: 3.88    Emergency Department Course:  Nursing notes and vitals reviewed. (3556) I performed an exam of the patient as documented above.      Blood drawn. This was sent to the lab for further testing, results above.     The patient was sent for a chest x-ray while in the emergency department, findings above.      EKG obtained in the ED, see results above.     (0120) I consulted with Dr. Cervantes,  Cardiology, regarding the patient's history and presentation here in the emergency department.      (4977) I rechecked the patient and discussed the results of his workup thus far.     Findings and plan explained to the Patient. Patient discharged home with instructions regarding supportive care, medications, and reasons to return. The importance of close follow-up was reviewed. The patient was  prescribed no medications, but fitted for a Holter Monitor to wear over the next 24 hours.      I personally reviewed the laboratory and imaging results with the Patient and answered all related questions prior to discharge.     Impression & Plan    Medical Decision Making:  Bassam Zuñiga is a 23 year old male with a complex past medical history significant for congenital heart disease and dextrocardia, presenting to the ER accompanied by his father for evaluation of palpitations. VS on presentation reveal elevated BP, though otherwise are unremarkable. Broad differential considered including, though not limited to, dysrhythmia, electrolyte imbalance, dehydration, endocrinopathy, pulmonary embolism, CHF, among others. EKG demonstrates sinus rhythm, right superior axis deviation, right ventricular hypertrophy, and incomplete right bundle branch block. EKG overall appears quite similar to outside EKG from April 2018. Here, there is no evidence to suggest WPW, or prolonged QTC. History of patient's symptoms are very suspicious for PVC/PAC. He has undergone previous Holter Monitor at outside facility which consulting cardiologist noted PVC/PAC seen at that time. His labs today reveal hemoglobin of 18.5 which I have recommended he follow-up with his PCP to ensure it is not consistently high.  Last Hgb 13 in 4/18. No gross metabolic derangements. He was monitored here in the ED without any hemodynamic decompensation. I felt his presentation was not consistent with CHF or pulmonary embolism. Case was reviewed with cardiology who felt supportive measures were indicated, and felt patient could be safely discharged home. Patient will be discharged home with a Holter monitor to provide ongoing monitoring though also suggested he follow-up with his primary cardiology team, which he and father will do. Patient updated and agreeable with this plan of care. All questions answered prior to discharge.     Diagnosis:    ICD-10-CM     1. Palpitations R00.2 Holter Monitor 24 hour - Adult     Disposition:  Discharged to home    Scribe Disclosure:  I, Natali Nessabiola, am serving as a scribe on 11/5/2018 at 11:47 PM to personally document services performed by Joshua Golden MD based on my observations and the provider's statements to me.     Natali Flores  11/5/2018   [facility] EMERGENCY DEPARTMENT       Joshua Golden MD  11/06/18 0203

## 2018-11-06 NOTE — DISCHARGE INSTRUCTIONS
"  Heart Palpitations    Palpitations are the feeling that your heart is beating hard, fast, or irregular. Some describe it as \"pounding\" or \"skipped beats.\" Palpitations may occur in someone with heart disease, but can also occur in a healthy person.  Heart-related causes:    Arrhythmia (a change from the heart's normal rhythm)    Heart valve disease    Disease of the heart muscle    Coronary artery disease    High blood pressure  Non-heart-related causes:    Certain medicines such as asthma inhalers and decongestants    Some herbal supplements, energy drinks and pills, and weight loss pills    Illegal stimulant drugs such as cocaine, crank, methamphetamine, PCP, bath salts, or ecstasy    Caffeine, alcohol, and tobacco    Medical conditions such as thyroid disease, anemia, anxiety, and panic disorder  Sometimes the cause can't be found.  Home care  Follow these home care tips:    Don't use too much caffeine, alcohol, tobacco, or any stimulant drugs.    Tell your doctor about any prescription or over-the-counter or herbal medicines you take.  Follow-up care    Follow up with your doctor, or as advised.  Call 911  This is the fastest and safest way to get to the emergency department. The paramedics can also begin treatment on the way to the hospital, if needed.  Don't wait until your symptoms are severe to call 911. These are reasons to call 911:    Chest pain    Shortness of breath    Feeling lightheaded, faint, or dizzy    Fainting or loss of consciousness    Very irregular heartbeat    Rapid heartbeat that makes you uncomfortable    Slower than usual heart rate associated with symptoms    Slower than usual heart rate    Chest pain with weakness, dizziness, heavy sweating, nausea, or vomiting    Extreme drowsiness or confusion    Weakness of an arm or leg, or on 1 side of the face    Difficulty with speech or vision  When to seek medical advice  Call your healthcare provider right away if you have palpitations and " any of the following:    Weakness    Dizziness    Lightheadedness    Fainting  Date Last Reviewed: 4/27/2016 2000-2018 [email or web address]. [address] [place] [address], [place] [address]. All rights reserved. This information is not intended as a substitute for professional medical care. Always follow your healthcare professional's instructions.

## 2022-03-09 ENCOUNTER — ANCILLARY PROCEDURE (OUTPATIENT)
Dept: GENERAL RADIOLOGY | Facility: CLINIC | Age: 27
End: 2022-03-09
Attending: FAMILY MEDICINE
Payer: COMMERCIAL

## 2022-03-09 ENCOUNTER — OFFICE VISIT (OUTPATIENT)
Dept: URGENT CARE | Facility: URGENT CARE | Age: 27
End: 2022-03-09
Payer: COMMERCIAL

## 2022-03-09 VITALS
WEIGHT: 140 LBS | TEMPERATURE: 98.8 F | OXYGEN SATURATION: 94 % | SYSTOLIC BLOOD PRESSURE: 122 MMHG | HEART RATE: 96 BPM | DIASTOLIC BLOOD PRESSURE: 71 MMHG

## 2022-03-09 DIAGNOSIS — R05.9 COUGH: Primary | ICD-10-CM

## 2022-03-09 DIAGNOSIS — R50.9 FEVER AND CHILLS: ICD-10-CM

## 2022-03-09 LAB
DEPRECATED S PYO AG THROAT QL EIA: NEGATIVE
FLUAV AG SPEC QL IA: NEGATIVE
FLUBV AG SPEC QL IA: NEGATIVE
GROUP A STREP BY PCR: NOT DETECTED

## 2022-03-09 PROCEDURE — 87651 STREP A DNA AMP PROBE: CPT | Performed by: FAMILY MEDICINE

## 2022-03-09 PROCEDURE — 87804 INFLUENZA ASSAY W/OPTIC: CPT | Performed by: FAMILY MEDICINE

## 2022-03-09 PROCEDURE — 99203 OFFICE O/P NEW LOW 30 MIN: CPT | Performed by: FAMILY MEDICINE

## 2022-03-09 PROCEDURE — 71046 X-RAY EXAM CHEST 2 VIEWS: CPT | Performed by: RADIOLOGY

## 2022-03-09 RX ORDER — ALBUTEROL SULFATE 90 UG/1
2 AEROSOL, METERED RESPIRATORY (INHALATION) EVERY 6 HOURS PRN
Qty: 18 G | Refills: 0 | Status: SHIPPED | OUTPATIENT
Start: 2022-03-09

## 2022-03-09 RX ORDER — BENZONATATE 200 MG/1
200 CAPSULE ORAL 3 TIMES DAILY PRN
Qty: 30 CAPSULE | Refills: 0 | Status: ON HOLD | OUTPATIENT
Start: 2022-03-09 | End: 2023-10-07

## 2022-03-09 NOTE — PATIENT INSTRUCTIONS
Okay to take ibuprofen 200 mg - 4 tablets (800 mg) every 8 hours as needed.  Okay to take tylenol 500 mg - 2 tablets (1000 mg) every 6-8 hours as needed, do not exceed 3000 mg in 24 hours.    We will contact you if the strep culture is positive.    Okay to try albuterol inhaler for cough  Okay to try tessalon perles for cough      Patient Education     Viral Upper Respiratory Illness (Adult)    You have a viral upper respiratory illness (URI), which is another term for the common cold. This illness is contagious during the first few days. It is spread through the air by coughing and sneezing. It may also be spread by direct contact (touching the sick person and then touching your own eyes, nose, or mouth). Frequent handwashing will decrease risk of spread. Most viral illnesses go away within 7 to 10 days with rest and simple home remedies. Sometimes the illness may last for several weeks. Antibiotics will not kill a virus, and they are generally not prescribed for this condition.  Home care    If symptoms are severe, rest at home for the first 2 to 3 days. When you resume activity, don't let yourself get too tired.    Don't smoke. If you need help stopping, talk with your healthcare provider.    Avoid being exposed to cigarette smoke (yours or others ).    You may use acetaminophen or ibuprofen to control pain and fever, unless another medicine was prescribed. If you have chronic liver or kidney disease, have ever had a stomach ulcer or gastrointestinal bleeding, or are taking blood-thinning medicines, talk with your healthcare provider before using these medicines. Aspirin should never be given to anyone under 18 years of age who is ill with a viral infection or fever. It may cause severe liver or brain damage.    Your appetite may be poor, so a light diet is fine. Stay well hydrated by drinking 6 to 8 glasses of fluids per day (water, soft drinks, juices, tea, or soup). Extra fluids will help loosen secretions in  the nose and lungs.    Over-the-counter cold medicines will not shorten the length of time you re sick, but they may be helpful for the following symptoms: cough, sore throat, and nasal and sinus congestion. If you take prescription medicines, ask your healthcare provider or pharmacist which over-the-counter medicines are safe to use. (Note: Don't use decongestants if you have high blood pressure.)  Follow-up care  Follow up with your healthcare provider, or as advised.  When to seek medical advice  Call your healthcare provider right away if any of these occur:    Cough with lots of colored sputum (mucus)    Severe headache; face, neck, or ear pain    Difficulty swallowing due to throat pain    Fever of 100.4 F (38 C) or higher, or as directed by your healthcare provider  Call 911  Call 911 if any of these occur:    Chest pain, shortness of breath, wheezing, or difficulty breathing    Coughing up blood    Very severe pain with swallowing, especially if it goes along with a muffled voice   Dmitri last reviewed this educational content on 6/1/2018 2000-2021 [employer]. All rights reserved. This information is not intended as a substitute for professional medical care. Always follow your healthcare professional's instructions.

## 2022-03-09 NOTE — PROGRESS NOTES
SUBJECTIVE:   New patient to [place]    Bassam Zuñiga is a 26 year old male presenting with a chief complaint of cough, fever, chills.  Onset of symptoms was 2 day(s) ago.  Course of illness is worsening.    Severity moderate  Current and Associated symptoms:   Treatment measures tried include: cough drops, .  Predisposing factors include: congenital heart disorder - stable.    Developed body ache, fatigue initially.  Was at work and noted that lungs were irritated by fumes - works as a  and usually has not problems.  Started to cough which is unusual and felt like lungs were on fire.  Denies any SOB or wheezing, no history of asthma.    Symptoms has improved but when does deep breaths that will start coughing again.  Sweating more at night.    Prior TOB use.  Completed Pfizer COVID vaccinations in April 2021    COVID infection in January 2022, fever, body ache, fatigue, chills.    Past Medical History:   Diagnosis Date     Congenital heart anomaly      Current Outpatient Medications   Medication Sig Dispense Refill     AMOXICILLIN PO Take 250 mg by mouth       ASPIRIN PO Take 81 mg by mouth       Social History     Tobacco Use     Smoking status: Former Smoker     Types: Cigars     Smokeless tobacco: Never Used   Substance Use Topics     Alcohol use: Yes     Comment: Every once in awhile       ROS:  Review of systems negative except as stated above.    OBJECTIVE:  /71 (BP Location: Right arm, Patient Position: Sitting, Cuff Size: Adult Regular)   Pulse 96   Temp 98.8  F (37.1  C)   Wt 63.5 kg (140 lb)   SpO2 94%   GENERAL APPEARANCE: healthy, alert and no distress  EYES: EOMI,  PERRL, conjunctiva clear  RESP: lungs clear to auscultation - no rales, rhonchi or wheezes  CV: regular rates and rhythm, normal S1 S2, no murmur noted  PSYCH: mentation appears normal and affect normal/bright    CXR - no acute infiltrate, no pleural effusion, no pneumothorax personally viewed by me    Results for orders  placed or performed in visit on 03/09/22   XR Chest 2 Views     Status: None    Narrative    CHEST TWO VIEWS 3/9/2022 1:25 PM     HISTORY: cough X 2 days; Cough; Fever and chills    COMPARISON: November 6, 2018       Impression    IMPRESSION: There are no acute infiltrates. The cardiac silhouette is  not enlarged. Pulmonary vasculature is unremarkable.    LOTUS DE LA GARZA MD         SYSTEM ID:  [id number]   Influenza A & B Antigen - Clinic Collect     Status: Normal    Specimen: Nose; Swab   Result Value Ref Range    Influenza A antigen Negative Negative    Influenza B antigen Negative Negative    Narrative    Test results must be correlated with clinical data. If necessary, results should be confirmed by a molecular assay or viral culture.   Streptococcus A Rapid Screen w/Reflex to PCR - Clinic Collect     Status: Normal    Specimen: Throat; Swab   Result Value Ref Range    Group A Strep antigen Negative Negative       ASSESSMENT/PLAN:  (R05.9) Cough  (primary encounter diagnosis)  Comment: viral URI  Plan: XR Chest 2 Views, Influenza A & B Antigen -         Clinic Collect, benzonatate (TESSALON) 200 MG         capsule, albuterol (PROAIR HFA/PROVENTIL         HFA/VENTOLIN HFA) 108 (90 Base) MCG/ACT inhaler            (R50.9) Fever and chills  Comment: viral URI  Plan: XR Chest 2 Views, Influenza A & B Antigen -         Clinic Collect, Streptococcus A Rapid Screen         w/Reflex to PCR - Clinic Collect, Group A         Streptococcus PCR Throat Swab            Reassurance given, reviewed that most likely viral illness and discussed symptomatic treatment with tylenol, ibuprofen, plenty of fluids and rest.  Will follow up on throat culture and treat if positive for strep.  RX tessalon perles and RX albuterol inhaler given to help with cough.    Work excuse note given  Follow up with primary provider if no improvement of symptoms in 1 week    Flo Parker MD  March 9, 2022 2:31 PM

## 2022-03-09 NOTE — LETTER
March 9, 2022      Bassam Zuñiga  [address] [facility] [address]        To Whom It May Concern:    Bassam Zuñiga  was seen on 3/9.  Please excuse him from work 3/8-3/10 due to illness.        Sincerely,        Flo Parker MD

## 2022-06-11 ENCOUNTER — HEALTH MAINTENANCE LETTER (OUTPATIENT)
Age: 27
End: 2022-06-11

## 2022-10-22 ENCOUNTER — HEALTH MAINTENANCE LETTER (OUTPATIENT)
Age: 27
End: 2022-10-22

## 2023-06-18 ENCOUNTER — HEALTH MAINTENANCE LETTER (OUTPATIENT)
Age: 28
End: 2023-06-18

## 2023-10-07 ENCOUNTER — HOSPITAL ENCOUNTER (INPATIENT)
Facility: CLINIC | Age: 28
LOS: 1 days | Discharge: HOME | End: 2023-10-10
Attending: STUDENT IN AN ORGANIZED HEALTH CARE EDUCATION/TRAINING PROGRAM | Admitting: STUDENT IN AN ORGANIZED HEALTH CARE EDUCATION/TRAINING PROGRAM
Payer: COMMERCIAL

## 2023-10-07 DIAGNOSIS — R11.10 HYPEREMESIS: Primary | ICD-10-CM

## 2023-10-07 LAB
ALBUMIN SERPL BCG-MCNC: 4.6 G/DL (ref 3.5–5.2)
ALP SERPL-CCNC: 71 U/L (ref 40–129)
ALT SERPL W P-5'-P-CCNC: 22 U/L (ref 0–70)
ANION GAP SERPL CALCULATED.3IONS-SCNC: 15 MMOL/L (ref 7–15)
AST SERPL W P-5'-P-CCNC: 26 U/L (ref 0–45)
BILIRUB SERPL-MCNC: 1.8 MG/DL
BUN SERPL-MCNC: 26.5 MG/DL (ref 6–20)
CALCIUM SERPL-MCNC: 9.1 MG/DL (ref 8.6–10)
CHLORIDE SERPL-SCNC: 99 MMOL/L (ref 98–107)
CREAT SERPL-MCNC: 0.87 MG/DL (ref 0.67–1.17)
DEPRECATED HCO3 PLAS-SCNC: 24 MMOL/L (ref 22–29)
EGFRCR SERPLBLD CKD-EPI 2021: >90 ML/MIN/1.73M2
ERYTHROCYTE [DISTWIDTH] IN BLOOD BY AUTOMATED COUNT: 14.7 % (ref 10–15)
GLUCOSE SERPL-MCNC: 142 MG/DL (ref 70–99)
HCT VFR BLD AUTO: 52.5 % (ref 40–53)
HGB BLD-MCNC: 18.6 G/DL (ref 13.3–17.7)
LIPASE SERPL-CCNC: 61 U/L (ref 13–60)
MAGNESIUM SERPL-MCNC: 2.5 MG/DL (ref 1.7–2.3)
MCH RBC QN AUTO: 35.1 PG (ref 26.5–33)
MCHC RBC AUTO-ENTMCNC: 35.4 G/DL (ref 31.5–36.5)
MCV RBC AUTO: 99 FL (ref 78–100)
PLATELET # BLD AUTO: 169 10E3/UL (ref 150–450)
POTASSIUM SERPL-SCNC: 3.9 MMOL/L (ref 3.4–5.3)
PROT SERPL-MCNC: 7.6 G/DL (ref 6.4–8.3)
RBC # BLD AUTO: 5.3 10E6/UL (ref 4.4–5.9)
SODIUM SERPL-SCNC: 138 MMOL/L (ref 135–145)
WBC # BLD AUTO: 12.5 10E3/UL (ref 4–11)

## 2023-10-07 PROCEDURE — G0379 DIRECT REFER HOSPITAL OBSERV: HCPCS

## 2023-10-07 PROCEDURE — 96374 THER/PROPH/DIAG INJ IV PUSH: CPT

## 2023-10-07 PROCEDURE — 36415 COLL VENOUS BLD VENIPUNCTURE: CPT | Performed by: STUDENT IN AN ORGANIZED HEALTH CARE EDUCATION/TRAINING PROGRAM

## 2023-10-07 PROCEDURE — C9113 INJ PANTOPRAZOLE SODIUM, VIA: HCPCS | Mod: JZ | Performed by: STUDENT IN AN ORGANIZED HEALTH CARE EDUCATION/TRAINING PROGRAM

## 2023-10-07 PROCEDURE — G0378 HOSPITAL OBSERVATION PER HR: HCPCS

## 2023-10-07 PROCEDURE — 93005 ELECTROCARDIOGRAM TRACING: CPT

## 2023-10-07 PROCEDURE — 258N000003 HC RX IP 258 OP 636: Performed by: STUDENT IN AN ORGANIZED HEALTH CARE EDUCATION/TRAINING PROGRAM

## 2023-10-07 PROCEDURE — 96361 HYDRATE IV INFUSION ADD-ON: CPT

## 2023-10-07 PROCEDURE — 85027 COMPLETE CBC AUTOMATED: CPT | Performed by: STUDENT IN AN ORGANIZED HEALTH CARE EDUCATION/TRAINING PROGRAM

## 2023-10-07 PROCEDURE — 83690 ASSAY OF LIPASE: CPT | Performed by: STUDENT IN AN ORGANIZED HEALTH CARE EDUCATION/TRAINING PROGRAM

## 2023-10-07 PROCEDURE — 93010 ELECTROCARDIOGRAM REPORT: CPT | Performed by: INTERNAL MEDICINE

## 2023-10-07 PROCEDURE — 83735 ASSAY OF MAGNESIUM: CPT | Performed by: STUDENT IN AN ORGANIZED HEALTH CARE EDUCATION/TRAINING PROGRAM

## 2023-10-07 PROCEDURE — 250N000011 HC RX IP 250 OP 636: Mod: JZ | Performed by: STUDENT IN AN ORGANIZED HEALTH CARE EDUCATION/TRAINING PROGRAM

## 2023-10-07 PROCEDURE — 99223 1ST HOSP IP/OBS HIGH 75: CPT | Mod: AI | Performed by: STUDENT IN AN ORGANIZED HEALTH CARE EDUCATION/TRAINING PROGRAM

## 2023-10-07 PROCEDURE — 80053 COMPREHEN METABOLIC PANEL: CPT | Performed by: STUDENT IN AN ORGANIZED HEALTH CARE EDUCATION/TRAINING PROGRAM

## 2023-10-07 RX ORDER — ONDANSETRON 2 MG/ML
4 INJECTION INTRAMUSCULAR; INTRAVENOUS EVERY 6 HOURS PRN
Status: DISCONTINUED | OUTPATIENT
Start: 2023-10-07 | End: 2023-10-09

## 2023-10-07 RX ORDER — SODIUM CHLORIDE, SODIUM LACTATE, POTASSIUM CHLORIDE, CALCIUM CHLORIDE 600; 310; 30; 20 MG/100ML; MG/100ML; MG/100ML; MG/100ML
INJECTION, SOLUTION INTRAVENOUS CONTINUOUS
Status: DISCONTINUED | OUTPATIENT
Start: 2023-10-07 | End: 2023-10-10 | Stop reason: HOSPADM

## 2023-10-07 RX ORDER — AMOXICILLIN 250 MG/1
250 CAPSULE ORAL DAILY
Status: DISCONTINUED | OUTPATIENT
Start: 2023-10-08 | End: 2023-10-10 | Stop reason: HOSPADM

## 2023-10-07 RX ORDER — ALBUTEROL SULFATE 90 UG/1
2 AEROSOL, METERED RESPIRATORY (INHALATION) EVERY 6 HOURS PRN
Status: DISCONTINUED | OUTPATIENT
Start: 2023-10-07 | End: 2023-10-10 | Stop reason: HOSPADM

## 2023-10-07 RX ORDER — ONDANSETRON 4 MG/1
4 TABLET, ORALLY DISINTEGRATING ORAL EVERY 6 HOURS PRN
Status: DISCONTINUED | OUTPATIENT
Start: 2023-10-07 | End: 2023-10-09

## 2023-10-07 RX ORDER — BISACODYL 5 MG
5 TABLET, DELAYED RELEASE (ENTERIC COATED) ORAL DAILY PRN
Status: DISCONTINUED | OUTPATIENT
Start: 2023-10-07 | End: 2023-10-10 | Stop reason: HOSPADM

## 2023-10-07 RX ORDER — BISACODYL 5 MG
10 TABLET, DELAYED RELEASE (ENTERIC COATED) ORAL DAILY PRN
Status: DISCONTINUED | OUTPATIENT
Start: 2023-10-07 | End: 2023-10-10 | Stop reason: HOSPADM

## 2023-10-07 RX ORDER — ASPIRIN 81 MG/1
81 TABLET ORAL DAILY
COMMUNITY

## 2023-10-07 RX ADMIN — PANTOPRAZOLE SODIUM 40 MG: 40 INJECTION, POWDER, FOR SOLUTION INTRAVENOUS at 21:14

## 2023-10-07 RX ADMIN — SODIUM CHLORIDE, POTASSIUM CHLORIDE, SODIUM LACTATE AND CALCIUM CHLORIDE: 600; 310; 30; 20 INJECTION, SOLUTION INTRAVENOUS at 21:01

## 2023-10-07 ASSESSMENT — ACTIVITIES OF DAILY LIVING (ADL)
ADLS_ACUITY_SCORE: 33
ADLS_ACUITY_SCORE: 31
ADLS_ACUITY_SCORE: 33

## 2023-10-07 ASSESSMENT — COLUMBIA-SUICIDE SEVERITY RATING SCALE - C-SSRS
4. HAVE YOU HAD THESE THOUGHTS AND HAD SOME INTENTION OF ACTING ON THEM?: NO
5. HAVE YOU STARTED TO WORK OUT OR WORKED OUT THE DETAILS OF HOW TO KILL YOURSELF? DO YOU INTEND TO CARRY OUT THIS PLAN?: NO
3. HAVE YOU BEEN THINKING ABOUT HOW YOU MIGHT KILL YOURSELF?: NO
6. HAVE YOU EVER DONE ANYTHING, STARTED TO DO ANYTHING, OR PREPARED TO DO ANYTHING TO END YOUR LIFE?: NO
2. HAVE YOU ACTUALLY HAD ANY THOUGHTS OF KILLING YOURSELF IN THE PAST MONTH?: NO
1. IN THE PAST MONTH, HAVE YOU WISHED YOU WERE DEAD OR WISHED YOU COULD GO TO SLEEP AND NOT WAKE UP?: NO

## 2023-10-07 NOTE — LETTER
[address]  [address] [place] [id number]  Phone: [phone]  Fax: [phone]    October 10, 2023        Bassam Zuñiga  [address] [place] [address]          To whom it may concern:    RE: Bassam Zuñiga    Patient was seen and treated from 10/8 to 10/10 and missed work. He may go back to work on 10/11.    Please contact me for questions or concerns.      Sincerely,      Fadumo Buckley MD

## 2023-10-07 NOTE — LETTER
[address]  [address] [place] [address]  Phone: [phone]  Fax: [phone]    October 10, 2023        Bassam Zuñiga  [address] [facility] [place] [address]          To whom it may concern:    RE: Bassam Zuñiga    Patient was seen and treated from 10/8 to 10/10 at this hospital and missed work. He may go back to work on 10/12.    Please contact me for questions or concerns.      Sincerely,        Fadumo Buckley MD

## 2023-10-08 LAB
ANION GAP SERPL CALCULATED.3IONS-SCNC: 13 MMOL/L (ref 7–15)
BUN SERPL-MCNC: 19.8 MG/DL (ref 6–20)
CALCIUM SERPL-MCNC: 8.7 MG/DL (ref 8.6–10)
CHLORIDE SERPL-SCNC: 101 MMOL/L (ref 98–107)
CREAT SERPL-MCNC: 0.79 MG/DL (ref 0.67–1.17)
DEPRECATED HCO3 PLAS-SCNC: 24 MMOL/L (ref 22–29)
EGFRCR SERPLBLD CKD-EPI 2021: >90 ML/MIN/1.73M2
GLUCOSE SERPL-MCNC: 126 MG/DL (ref 70–99)
POTASSIUM SERPL-SCNC: 3.8 MMOL/L (ref 3.4–5.3)
SODIUM SERPL-SCNC: 138 MMOL/L (ref 135–145)

## 2023-10-08 PROCEDURE — 99232 SBSQ HOSP IP/OBS MODERATE 35: CPT | Performed by: STUDENT IN AN ORGANIZED HEALTH CARE EDUCATION/TRAINING PROGRAM

## 2023-10-08 PROCEDURE — 250N000011 HC RX IP 250 OP 636: Mod: JZ | Performed by: STUDENT IN AN ORGANIZED HEALTH CARE EDUCATION/TRAINING PROGRAM

## 2023-10-08 PROCEDURE — 80048 BASIC METABOLIC PNL TOTAL CA: CPT | Performed by: STUDENT IN AN ORGANIZED HEALTH CARE EDUCATION/TRAINING PROGRAM

## 2023-10-08 PROCEDURE — 96375 TX/PRO/DX INJ NEW DRUG ADDON: CPT

## 2023-10-08 PROCEDURE — 250N000011 HC RX IP 250 OP 636: Performed by: INTERNAL MEDICINE

## 2023-10-08 PROCEDURE — 250N000013 HC RX MED GY IP 250 OP 250 PS 637: Performed by: STUDENT IN AN ORGANIZED HEALTH CARE EDUCATION/TRAINING PROGRAM

## 2023-10-08 PROCEDURE — 36415 COLL VENOUS BLD VENIPUNCTURE: CPT | Performed by: STUDENT IN AN ORGANIZED HEALTH CARE EDUCATION/TRAINING PROGRAM

## 2023-10-08 PROCEDURE — C9113 INJ PANTOPRAZOLE SODIUM, VIA: HCPCS | Mod: JZ | Performed by: STUDENT IN AN ORGANIZED HEALTH CARE EDUCATION/TRAINING PROGRAM

## 2023-10-08 PROCEDURE — G0378 HOSPITAL OBSERVATION PER HR: HCPCS

## 2023-10-08 PROCEDURE — 258N000003 HC RX IP 258 OP 636: Performed by: STUDENT IN AN ORGANIZED HEALTH CARE EDUCATION/TRAINING PROGRAM

## 2023-10-08 PROCEDURE — 258N000003 HC RX IP 258 OP 636: Performed by: HOSPITALIST

## 2023-10-08 PROCEDURE — 96361 HYDRATE IV INFUSION ADD-ON: CPT

## 2023-10-08 PROCEDURE — 96376 TX/PRO/DX INJ SAME DRUG ADON: CPT

## 2023-10-08 PROCEDURE — 250N000011 HC RX IP 250 OP 636: Mod: JZ | Performed by: HOSPITALIST

## 2023-10-08 RX ADMIN — PROMETHAZINE HYDROCHLORIDE 12.5 MG: 25 INJECTION INTRAMUSCULAR; INTRAVENOUS at 21:53

## 2023-10-08 RX ADMIN — SODIUM CHLORIDE, POTASSIUM CHLORIDE, SODIUM LACTATE AND CALCIUM CHLORIDE: 600; 310; 30; 20 INJECTION, SOLUTION INTRAVENOUS at 04:54

## 2023-10-08 RX ADMIN — ONDANSETRON 4 MG: 4 TABLET, ORALLY DISINTEGRATING ORAL at 01:03

## 2023-10-08 RX ADMIN — PANTOPRAZOLE SODIUM 40 MG: 40 INJECTION, POWDER, FOR SOLUTION INTRAVENOUS at 10:21

## 2023-10-08 RX ADMIN — SODIUM CHLORIDE, POTASSIUM CHLORIDE, SODIUM LACTATE AND CALCIUM CHLORIDE: 600; 310; 30; 20 INJECTION, SOLUTION INTRAVENOUS at 12:27

## 2023-10-08 RX ADMIN — ONDANSETRON 4 MG: 2 INJECTION INTRAMUSCULAR; INTRAVENOUS at 19:13

## 2023-10-08 RX ADMIN — AMOXICILLIN 250 MG: 250 CAPSULE ORAL at 10:21

## 2023-10-08 RX ADMIN — PROCHLORPERAZINE EDISYLATE 5 MG: 5 INJECTION INTRAMUSCULAR; INTRAVENOUS at 01:48

## 2023-10-08 RX ADMIN — PROCHLORPERAZINE EDISYLATE 5 MG: 5 INJECTION INTRAMUSCULAR; INTRAVENOUS at 20:34

## 2023-10-08 ASSESSMENT — ACTIVITIES OF DAILY LIVING (ADL)
ADLS_ACUITY_SCORE: 33

## 2023-10-08 NOTE — PROGRESS NOTES
PRIMARY DIAGNOSIS: Hyperemesis  OUTPATIENT/OBSERVATION GOALS TO BE MET BEFORE DISCHARGE:  ADLs back to baseline: Yes    Activity and level of assistance: Ambulating independently.    Pain status: Pain free.    Return to near baseline physical activity: Yes     Discharge Planner Nurse   Safe discharge environment identified: Yes  Barriers to discharge: No       Entered by: Leslie Moran RN 10/08/2023 5:13 AM     Please review provider order for any additional goals.   Nurse to notify provider when observation goals have been met and patient is ready for discharge.

## 2023-10-08 NOTE — PROGRESS NOTES
PRIMARY DIAGNOSIS: Nausea   OUTPATIENT/OBSERVATION GOALS TO BE MET BEFORE DISCHARGE:  ADLs back to baseline: Yes    Activity and level of assistance: Ambulating independently.    Pain status: Pain free.    Return to near baseline physical activity: Yes     Discharge Planner Nurse   Safe discharge environment identified: No  Barriers to discharge: Yes,        Entered by: Teresa Jones RN 10/07/2023 8:29 PM     Please review provider order for any additional goals.   Nurse to notify provider when observation goals have been met and patient is ready for discharge.

## 2023-10-08 NOTE — PHARMACY-ADMISSION MEDICATION HISTORY
Pharmacist Admission Medication History    Admission medication history is complete. The information provided in this note is only as accurate as the sources available at the time of the update.    Information Source(s): Patient, Clinic records, Hospital records, and CareEverywhere/SureScripts via phone    Pertinent Information: amoxicillin 250mg daily long-term for asplenia     Changes made to PTA medication list:  Added: None  Deleted: Benzonatate  Changed: None    Medication Affordability:       Allergies reviewed with patient and updates made in EHR: yes    Medication History Completed By: Sofi Martinez PharmD 10/7/2023 9:30 PM    PTA Med List   Medication Sig Last Dose    albuterol (PROAIR HFA/PROVENTIL HFA/VENTOLIN HFA) 108 (90 Base) MCG/ACT inhaler Inhale 2 puffs into the lungs every 6 hours as needed for shortness of breath / dyspnea or wheezing Unknown at prn    amoxicillin (AMOXIL) 250 MG capsule Take 250 mg by mouth daily Long term 10/4/2023    aspirin 81 MG EC tablet Take 81 mg by mouth daily 10/4/2023

## 2023-10-08 NOTE — PROGRESS NOTES
PRIMARY DIAGNOSIS: Hyperemesis  OUTPATIENT/OBSERVATION GOALS TO BE MET BEFORE DISCHARGE:  ADLs back to baseline: Yes    Activity and level of assistance: Ambulating independently.    Pain status: Pain free.    Return to near baseline physical activity: Yes     Discharge Planner Nurse   Safe discharge environment identified: Yes  Barriers to discharge: No       Entered by: Leslie Moran RN 10/08/2023 5:14 AM   Patient denies pain.   VSS.   Nausea and emesis x1,    Zofran ineffective in controlling nausea and vomiting. Obtained order for Compazine prn, which was effective in relief of symptoms and further emesis.   Will continue to monitor.  Please review provider order for any additional goals.   Nurse to notify provider when observation goals have been met and patient is ready for discharge.

## 2023-10-08 NOTE — PROGRESS NOTES
"PRIMARY DIAGNOSIS: \"GENERIC\" NURSING  OUTPATIENT/OBSERVATION GOALS TO BE MET BEFORE DISCHARGE:  ADLs back to baseline: Yes    Activity and level of assistance: Ambulating independently.    Pain status: Pain free.    Return to near baseline physical activity: No     Discharge Planner Nurse   Safe discharge environment identified: Yes  Barriers to discharge: Yes, needs to tolderate regular diet to discharge.         Entered by: Moni Mckenna RN 10/08/2023 2:38 PM     Please review provider order for any additional goals.   Nurse to notify provider when observation goals have been met and patient is ready for discharge.  "

## 2023-10-08 NOTE — H&P
[facility] MEDICINE ADMISSION HISTORY AND PHYSICAL   Date of Admission: 10/7/2023  Bassam Zuñiga, 1995, [id number]    Identification/Summary:   Bassam Zuñiga is a 28 year old male with PMH signficant for cannabis hyperemesis syndrome .  Presented with hyperemesis.        Assessment and Plan:  Nausea/vomiting  Hx of cannabinoid hyperemesis syndrome  Precipitated by alcohol, spicy food, marijuana which he used on Wed and Thur. Also has mild epigastric tenderness. No recent use of abx or ibuprofen  - CBC, CMP  - pantoprazole IV  - ECG before more anti-emetics  - CLD tonight  - check lipase    Hypokalemia  Hypocalcemia?  K 3.4, Ca 7.8 at urgent care.  - Repeat CMP and magnesium    Peripheral tingling and weakness  Episodic since ill, likely from hypokalemia  -monitor    Asplenia  Per patient, he is supposed to take amoxicillin 250mg daily for asplenia but wasn't taking.  - resume tomorrow    Hyperbilirubinemia  2.3, monitor, likely follow up as an outpatient    Constipation  Last BM was on Thur  Replete lytes  - bowel regimen    Code Status: Full Code    IVF: LR 150ml/h    Anticoagulation   Low Risk/Ambulatory with no VTE prophylaxis indicated      Disposition: Observation     Clinically Significant Risk Factors Present on Admission                # Drug Induced Platelet Defect: home medication list includes an antiplatelet medication                   Chief Complaint emesis     HISTORY     Bassam Zuñiga is a 28 year old male with PMH signficant for cannabis hyperemesis syndrome .  Presented with hyperemesis.    He drank alcohol, ate spicy food and used marijuana on Wed and Thur, which are known causes of emesis for him in the past.  Started on Thurs, multiple times a day. Streaks of blood. No abd pain or diarrhea. Hasn't had a BM since Thur. Denies f/c. Has cough. Also episodic peripheral tingling/weakness. Resolve on itself.    Never had alcohol withdrawal or pancreatitis.     ast  Medical History, Ca    Past Medical History:  No date: Congenital heart anomaly     Patient Active Problem List    Diagnosis Date Noted    Hyperemesis 10/07/2023     Priority: Medium     Surgical History     Past Surgical History:   Procedure Laterality Date    CARDIAC SURGERY      HERNIA REPAIR       Family History    No family history on file.   Social History      Social History     Tobacco Use    Smoking status: Former     Types: Cigars    Smokeless tobacco: Never   Substance Use Topics    Alcohol use: Yes     Comment: Every once in awhile    Drug use: Not Currently     Types: Marijuana     Comment: 2 gms daily      Allergies   No Known Allergies  Prior to Admission Medications      Prior to Admission Medications   Prescriptions Last Dose Informant Patient Reported? Taking?   AMOXICILLIN PO   Yes No   Sig: Take 250 mg by mouth   ASPIRIN PO   Yes No   Sig: Take 81 mg by mouth   albuterol (PROAIR HFA/PROVENTIL HFA/VENTOLIN HFA) 108 (90 Base) MCG/ACT inhaler   No No   Sig: Inhale 2 puffs into the lungs every 6 hours as needed for shortness of breath / dyspnea or wheezing   benzonatate (TESSALON) 200 MG capsule   No No   Sig: Take 1 capsule (200 mg) by mouth 3 times daily as needed for cough      Facility-Administered Medications: None      Review of Systems     A 12 point comprehensive review of systems was negative except as noted above in HPI.    PHYSICAL EXAMINATION     Vitals      Temp:  [98.6  F (37  C)] 98.6  F (37  C)  Pulse:  [58] 58  Resp:  [18] 18  BP: (123)/(65) 123/65    Examination     General Appearance: Alert and wake, not in distress  Respiratory: clear lungs, bilateral peripheral wheezing  Cardiovascular: rhythmic, normal S1 and S2, no murmur  GI: soft, epigastric tenderness, normal bowel sound  Neurology: oriented x 3  Psych: cooperative and calm, normal affect      Pertinent Lab   No results found for this or any previous visit (from the past 24 hour(s)).    Pertinent Radiology     Radiology  Results:   Recent Results (from the past 24 hour(s))   CT ABDOMEN PELVIS W    Narrative    For Patients: As a result of the 21st Century Cures Act, medical imaging exams and procedure reports are released immediately into your electronic medical record. You may view this report before your referring provider. If you have questions, please contact your health care provider.    EXAM: CT ABDOMEN PELVIS W  LOCATION: [facility]  DATE: 10/7/2023    INDICATION: abdominal pain, nausea/vomiting  COMPARISON: CT 05/19/2017  TECHNIQUE: CT scan of the abdomen and pelvis was performed following injection of IV contrast. Multiplanar reformats were obtained. Dose reduction techniques were used.  CONTRAST: 100 mL Isovue-300    FINDINGS:   LOWER CHEST: No focal airspace consolidation or pleural effusion.    HEPATOBILIARY: Abnormal configuration of the liver with extension across the midline and left sided intrahepatic IVC. No focal mass or biliary obstruction.    PANCREAS: Abnormal configuration wrapping around the portal vein with tail extending into the right upper quadrant of the abdomen. No ductal dilation or surrounding fat stranding.    SPLEEN: Congenitally absent.    ADRENAL GLANDS: Normal.    KIDNEYS/BLADDER: No hydronephrosis. Urinary bladder is unremarkable.    BOWEL: Stomach in right upper quadrant with abnormal configuration of the duodenum. Distal small bowel and colon in normal position. No obstruction or inflammatory change.     LYMPH NODES: No suspicious lymphadenopathy.    VASCULATURE: Left-sided inferior vena cava. 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 Reconstitution at the level of the common femoral artery, likely from collaterals from the right internal iliac artery. Left pelvic venous varix. No acute vascular abnormality.    PELVIC ORGANS: Normal.    MUSCULOSKELETAL: No acute bony abnormality.    Impression    1.  No acute abnormality abdomen or pelvis  to explain patient's pain.  2.  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         80 MINUTES SPENT BY ME on the date of service doing chart review, history, exam, documentation & further activities per the note.        JANNA ROSE MD  [facility]  [facility]  [facility]   Phone: #[phone]

## 2023-10-08 NOTE — PROGRESS NOTES
Chart reviewed for Care Management assessment, readmission risk 7%. Pt is single, employed, and has been independent with ADLs at baseline.  Follows with [facility].  Anticipate return home with OP follow up.  Care Management will continue to follow for care progression and potential discharge needs.

## 2023-10-08 NOTE — PROGRESS NOTES
[facility] MEDICINE  PROGRESS NOTE       Securely message me with Sonya (more info)    Code Status: Full Code       Identification/Summary:   Bassam Zuñiga is a 28 year old male with PMH signficant for cannabis hyperemesis syndrome .  Presented with hyperemesis.      Advancing diet on 10/8. If he tolerates solid food, please Vocera me before 8pm and we can talk about discharge tonight.     Assessment and Plan:  Nausea/vomiting  Hx of cannabinoid hyperemesis syndrome  Precipitated by alcohol, spicy food, marijuana which he used on Wed and Thur. Also has mild epigastric tenderness. No recent use of abx or ibuprofen  - pantoprazole IV  - CLD tonight > regular diet  - reviewed ECG, Qtc < 500  - lipase is mildly elevated, can be from vomiting     Hypokalemia  Hypocalcemia?  K 3.4, Ca 7.8 at urgent care.  - Repeat CMP and magnesium - normalized and Mg 2.5     Peripheral tingling and weakness  Episodic since ill, likely from hypokalemia  -monitor     Asplenia  Per patient, he is supposed to take amoxicillin 250mg daily for asplenia but wasn't taking.  - resume tomorrow     Hyperbilirubinemia  2.3, monitor, likely follow up as an outpatient     Constipation  Last BM was on Thur  Replete lytes  - bowel regimen     Code Status: Full Code     IVF: LR 150ml/h     Anticoagulation   Low Risk/Ambulatory with no VTE prophylaxis indicated        Disposition: Observation     Clinically Significant Risk Factors Present on Admission                # Drug Induced Platelet Defect: home medication list includes an antiplatelet medication                   Interval History/Subjective:  Last vomited last night. Had some fluid this morning. He doesn't feel he could proceed to solid food yet. Denies abd pain. Questions answered to verbalized satisfaction.      Last 24H PRN:     ondansetron (ZOFRAN ODT) ODT tab 4 mg, 4 mg at 10/08/23 0103 **OR** ondansetron (ZOFRAN) injection 4 mg    prochlorperazine  (COMPAZINE) injection 5 mg, 5 mg at 10/08/23 0148    Physical Exam/Objective:  Temp:  [98.5  F (36.9  C)-98.6  F (37  C)] 98.5  F (36.9  C)  Pulse:  [50-74] 74  Resp:  [16-18] 16  BP: ()/(50-92) 134/92  SpO2:  [92 %-95 %] 95 %  Wt Readings from Last 4 Encounters:   10/08/23 58.6 kg (129 lb 3.2 oz)   03/09/22 63.5 kg (140 lb)   11/05/18 56.7 kg (125 lb)   05/19/17 51.8 kg (114 lb 3.2 oz)     There is no height or weight on file to calculate BMI.    General Appearance: Alert and wake, not in distress  GI: soft, non-tender, normal bowel sound  Neurology: oriented x 3  Psych: cooperative and calm, normal affect    Medications:   Personally Reviewed.  Medications    lactated ringers 150 mL/hr at 10/08/23 0454      amoxicillin  250 mg Oral Daily    pantoprazole  40 mg Intravenous Daily with breakfast       Data reviewed today: I personally reviewed all new medications, labs, imaging/diagnostics reports over the past 24 hours. Pertinent findings include:    Imaging:   Recent Results (from the past 24 hour(s))   CT ABDOMEN PELVIS W    Narrative    For Patients: As a result of the 21st Century Cures Act, medical imaging exams and procedure reports are released immediately into your electronic medical record. You may view this report before your referring provider. If you have questions, please contact your health care provider.    EXAM: CT ABDOMEN PELVIS W  LOCATION: [facility]  DATE: 10/7/2023    INDICATION: abdominal pain, nausea/vomiting  COMPARISON: CT 05/19/2017  TECHNIQUE: CT scan of the abdomen and pelvis was performed following injection of IV contrast. Multiplanar reformats were obtained. Dose reduction techniques were used.  CONTRAST: 100 mL Isovue-300    FINDINGS:   LOWER CHEST: No focal airspace consolidation or pleural effusion.    HEPATOBILIARY: Abnormal configuration of the liver with extension across the midline and left sided intrahepatic IVC. No focal mass or biliary  obstruction.    PANCREAS: Abnormal configuration wrapping around the portal vein with tail extending into the right upper quadrant of the abdomen. No ductal dilation or surrounding fat stranding.    SPLEEN: Congenitally absent.    ADRENAL GLANDS: Normal.    KIDNEYS/BLADDER: No hydronephrosis. Urinary bladder is unremarkable.    BOWEL: Stomach in right upper quadrant with abnormal configuration of the duodenum. Distal small bowel and colon in normal position. No obstruction or inflammatory change.     LYMPH NODES: No suspicious lymphadenopathy.    VASCULATURE: Left-sided inferior vena cava. 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 Reconstitution at the level of the common femoral artery, likely from collaterals from the right internal iliac artery. Left pelvic venous varix. No acute vascular abnormality.    PELVIC ORGANS: Normal.    MUSCULOSKELETAL: No acute bony abnormality.    Impression    1.  No acute abnormality abdomen or pelvis to explain patient's pain.  2.  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           40 MINUTES SPENT BY ME on the date of service doing chart review, history, exam, documentation & further activities per the note.      JANNA ROSE MD  [facility] Medicine  [facility]  Phone: #[phone]    Securely message me with SMIC (more info)

## 2023-10-08 NOTE — PROGRESS NOTES
"PRIMARY DIAGNOSIS: \"GENERIC\" NURSING  OUTPATIENT/OBSERVATION GOALS TO BE MET BEFORE DISCHARGE:  ADLs back to baseline: Yes    Activity and level of assistance: Ambulating independently.    Pain status: Pain free.    Return to near baseline physical activity: Yes     Discharge Planner Nurse   Safe discharge environment identified: Yes  Barriers to discharge: Yes, tolerating solid food         Entered by: Teresa Jones RN 10/08/2023 5:16 PM     Please review provider order for any additional goals.   Nurse to notify provider when observation goals have been met and patient is ready for discharge.    A&Ox4 and VSS. Pt denies pain, Independent within the room, and pt took many showers on shift. Call light within reach and pt makes needs know.   "

## 2023-10-08 NOTE — PROGRESS NOTES
PRIMARY DIAGNOSIS: Nausea   OUTPATIENT/OBSERVATION GOALS TO BE MET BEFORE DISCHARGE:  ADLs back to baseline: Yes    Activity and level of assistance: Ambulating independently.    Pain status: Pain free.    Return to near baseline physical activity: Yes     Discharge Planner Nurse   Safe discharge environment identified: No  Barriers to discharge: Yes,        Entered by: Teresa Jones RN 10/07/2023 11:11 PM     Please review provider order for any additional goals.   Nurse to notify provider when observation goals have been met and patient is ready for discharge.    PT denies pain, denies N/V. Pt is independent. Pt had shower on shift. LR running at 150 mL. Cards done on test. Protonix IV push given. Call light within reach.

## 2023-10-08 NOTE — PROGRESS NOTES
"PRIMARY DIAGNOSIS: \"GENERIC\" NURSING  OUTPATIENT/OBSERVATION GOALS TO BE MET BEFORE DISCHARGE:  ADLs back to baseline: Yes    Activity and level of assistance: Ambulating independently.    Pain status: Pain free.    Return to near baseline physical activity: No     Discharge Planner Nurse   Safe discharge environment identified: Yes  Barriers to discharge: Yes, not tolerating diet.         Entered by: Moni Mckenna RN 10/08/2023 2:37 PM     Please review provider order for any additional goals.   Nurse to notify provider when observation goals have been met and patient is ready for discharge.  "

## 2023-10-09 PROCEDURE — C9113 INJ PANTOPRAZOLE SODIUM, VIA: HCPCS | Mod: JZ | Performed by: STUDENT IN AN ORGANIZED HEALTH CARE EDUCATION/TRAINING PROGRAM

## 2023-10-09 PROCEDURE — 258N000003 HC RX IP 258 OP 636: Performed by: STUDENT IN AN ORGANIZED HEALTH CARE EDUCATION/TRAINING PROGRAM

## 2023-10-09 PROCEDURE — 250N000011 HC RX IP 250 OP 636: Performed by: INTERNAL MEDICINE

## 2023-10-09 PROCEDURE — 250N000011 HC RX IP 250 OP 636: Mod: JZ | Performed by: HOSPITALIST

## 2023-10-09 PROCEDURE — 250N000013 HC RX MED GY IP 250 OP 250 PS 637: Performed by: STUDENT IN AN ORGANIZED HEALTH CARE EDUCATION/TRAINING PROGRAM

## 2023-10-09 PROCEDURE — 258N000003 HC RX IP 258 OP 636: Performed by: HOSPITALIST

## 2023-10-09 PROCEDURE — 250N000011 HC RX IP 250 OP 636: Performed by: STUDENT IN AN ORGANIZED HEALTH CARE EDUCATION/TRAINING PROGRAM

## 2023-10-09 PROCEDURE — 99232 SBSQ HOSP IP/OBS MODERATE 35: CPT | Performed by: STUDENT IN AN ORGANIZED HEALTH CARE EDUCATION/TRAINING PROGRAM

## 2023-10-09 PROCEDURE — 96361 HYDRATE IV INFUSION ADD-ON: CPT

## 2023-10-09 PROCEDURE — G0378 HOSPITAL OBSERVATION PER HR: HCPCS

## 2023-10-09 PROCEDURE — 96376 TX/PRO/DX INJ SAME DRUG ADON: CPT

## 2023-10-09 RX ORDER — ONDANSETRON 4 MG/1
4 TABLET, ORALLY DISINTEGRATING ORAL EVERY 6 HOURS
Status: DISCONTINUED | OUTPATIENT
Start: 2023-10-09 | End: 2023-10-10 | Stop reason: HOSPADM

## 2023-10-09 RX ORDER — DOCUSATE SODIUM 100 MG/1
100 CAPSULE, LIQUID FILLED ORAL 2 TIMES DAILY
Status: DISCONTINUED | OUTPATIENT
Start: 2023-10-09 | End: 2023-10-10 | Stop reason: HOSPADM

## 2023-10-09 RX ADMIN — ONDANSETRON 4 MG: 4 TABLET, ORALLY DISINTEGRATING ORAL at 09:14

## 2023-10-09 RX ADMIN — PANTOPRAZOLE SODIUM 40 MG: 40 INJECTION, POWDER, FOR SOLUTION INTRAVENOUS at 09:11

## 2023-10-09 RX ADMIN — PROCHLORPERAZINE EDISYLATE 5 MG: 5 INJECTION INTRAMUSCULAR; INTRAVENOUS at 22:01

## 2023-10-09 RX ADMIN — ONDANSETRON 4 MG: 4 TABLET, ORALLY DISINTEGRATING ORAL at 19:33

## 2023-10-09 RX ADMIN — DOCUSATE SODIUM 100 MG: 100 CAPSULE, LIQUID FILLED ORAL at 09:14

## 2023-10-09 RX ADMIN — DOCUSATE SODIUM 100 MG: 100 CAPSULE, LIQUID FILLED ORAL at 21:56

## 2023-10-09 RX ADMIN — AMOXICILLIN 250 MG: 250 CAPSULE ORAL at 09:11

## 2023-10-09 RX ADMIN — SODIUM CHLORIDE, POTASSIUM CHLORIDE, SODIUM LACTATE AND CALCIUM CHLORIDE: 600; 310; 30; 20 INJECTION, SOLUTION INTRAVENOUS at 01:44

## 2023-10-09 RX ADMIN — PROMETHAZINE HYDROCHLORIDE 12.5 MG: 25 INJECTION INTRAMUSCULAR; INTRAVENOUS at 17:41

## 2023-10-09 RX ADMIN — ONDANSETRON 4 MG: 4 TABLET, ORALLY DISINTEGRATING ORAL at 13:30

## 2023-10-09 RX ADMIN — PROCHLORPERAZINE EDISYLATE 5 MG: 5 INJECTION INTRAMUSCULAR; INTRAVENOUS at 10:56

## 2023-10-09 ASSESSMENT — ACTIVITIES OF DAILY LIVING (ADL)
ADLS_ACUITY_SCORE: 33

## 2023-10-09 NOTE — PROGRESS NOTES
"PRIMARY DIAGNOSIS: \"GENERIC\" NURSING  OUTPATIENT/OBSERVATION GOALS TO BE MET BEFORE DISCHARGE:  ADLs back to baseline: Yes    Activity and level of assistance: Ambulating independently.    Pain status: Pain free.    Return to near baseline physical activity: Yes     Discharge Planner Nurse   Safe discharge environment identified: Yes  Barriers to discharge: Yes - Still complaining of nausea, continues on scheduled Zofran with some effectiveness.        Entered by: Susie Wilson RN 10/09/2023 3:27 PM     Please review provider order for any additional goals.   Nurse to notify provider when observation goals have been met and patient is ready for discharge.  "

## 2023-10-09 NOTE — PROGRESS NOTES
PRIMARY DIAGNOSIS: Hypremesis  OUTPATIENT/OBSERVATION GOALS TO BE MET BEFORE DISCHARGE:  ADLs back to baseline: Yes    Activity and level of assistance: Ambulating independently.    Pain status: Pain free.    Return to near baseline physical activity: Yes     Discharge Planner Nurse   Safe discharge environment identified: Yes  Barriers to discharge: No       Entered by: Leslie Moran RN 10/09/2023 6:48 AM     Please review provider order for any additional goals.   Nurse to notify provider when observation goals have been met and patient is ready for discharge.

## 2023-10-09 NOTE — PROGRESS NOTES
[facility] MEDICINE  PROGRESS NOTE       Securely message me with Sonya (more info)    Code Status: Full Code       Identification/Summary:   Bassam Zuñiga is a 28 year old male with PMH signficant for cannabis hyperemesis syndrome .  Presented with hyperemesis.      Advancing diet on 10/8. If he tolerates solid food, please Vocera me before 8pm and we can talk about discharge tonight.     Assessment and Plan:  Nausea/vomiting  Hx of cannabinoid hyperemesis syndrome  Constipation  Precipitated by alcohol, spicy food, marijuana which he used on Wed and Thur. Also has mild epigastric tenderness. No recent use of abx or ibuprofen  - pantoprazole IV  - CLD > regular diet  - reviewed ECG, Qtc < 500  - schedule Zofran on 10/9, continue Compazine and phenergan prn  - schedule docusate on 10/9, bisacodyl prn, had 1 BM here  - lipase is mildly elevated, can be from vomiting     Hypertension  BP elevated with IVF, but mucosa still dry  - will continue IVF, OK for a pause for 1h per patient request  - outpatient follow up for BP    Hypokalemia  Hypocalcemia?  K 3.4, Ca 7.8 at urgent care.  - Repeat CMP and magnesium - normalized and Mg 2.5  - no longer vomiting frequently, will stop checking     Peripheral tingling and weakness  Episodic since ill, likely from hypokalemia  -monitor     Asplenia  Per patient, he is supposed to take amoxicillin 250mg daily for asplenia but wasn't taking.  - resume tomorrow     Hyperbilirubinemia  2.3, monitor, likely follow up as an outpatient       Code Status: Full Code     IVF: LR 150ml/h     Anticoagulation   Low Risk/Ambulatory with no VTE prophylaxis indicated        Disposition: Observation              Barriers to Discharge: not tolerating solid food, IVF    Disposition: home at discharge    Clinically Significant Risk Factors Present on Admission                # Drug Induced Platelet Defect: home medication list includes an antiplatelet medication                    Interval History/Subjective:  Still nauseated and tired. Last vomited last night. No other concern at this time. Questions answered to verbalized satisfaction.      Last 24H PRN:     prochlorperazine (COMPAZINE) injection 5 mg, 5 mg at 10/08/23 2034    promethazine (PHENERGAN) 12.5 mg in sodium chloride 0.9 % 55 mL intermittent infusion, 12.5 mg at 10/08/23 2153    Physical Exam/Objective:  Temp:  [97.9  F (36.6  C)-99.3  F (37.4  C)] 97.9  F (36.6  C)  Pulse:  [63-85] 63  Resp:  [16-20] 18  BP: (119-174)/() 153/91  SpO2:  [93 %-95 %] 93 %  Wt Readings from Last 4 Encounters:   10/09/23 59 kg (130 lb)   03/09/22 63.5 kg (140 lb)   11/05/18 56.7 kg (125 lb)   05/19/17 51.8 kg (114 lb 3.2 oz)     There is no height or weight on file to calculate BMI.    General Appearance: Alert and wake, not in distress  Respiratory: clear lungs, no crackles or wheezing  Cardiovascular: rhythmic, normal S1 and S2, no murmur  GI: soft, non-tender, hypoactive bowel sound  Neurology: oriented x 3  Psych: cooperative and calm, normal affect    Medications:   Personally Reviewed.  Medications    [Held by provider] lactated ringers 150 mL/hr at 10/09/23 0144      amoxicillin  250 mg Oral Daily    docusate sodium  100 mg Oral BID    ondansetron  4 mg Oral Q6H    pantoprazole  40 mg Intravenous Daily with breakfast       Data reviewed today: I personally reviewed all new medications, labs, imaging/diagnostics reports over the past 24 hours. Pertinent findings include:    Imaging:   No results found for this or any previous visit (from the past 24 hour(s)).    Labs:  No orders to display     No results found for this or any previous visit (from the past 24 hour(s)).    Pending Labs:  Unresulted Labs Ordered in the Past 30 Days of this Admission       No orders found from 9/7/2023 to 10/8/2023.              40 MINUTES SPENT BY ME on the date of service doing chart review, history, exam, documentation & further activities  per the note.      JANNA ROSE MD  [facility] Medicine  [facility]  Phone: #[phone]    Securely message me with Sonya (more info)

## 2023-10-09 NOTE — PROGRESS NOTES
"PRIMARY DIAGNOSIS: \"GENERIC\" NURSING  OUTPATIENT/OBSERVATION GOALS TO BE MET BEFORE DISCHARGE:  ADLs back to baseline: Yes    Activity and level of assistance: Ambulating independently.    Pain status: Pain free.    Return to near baseline physical activity: Yes     Discharge Planner Nurse   Safe discharge environment identified: Yes  Barriers to discharge: Yes - Nausea, continuous. Given PRN Zofran, promethazine with minimal effectiveness. No emesis occurred today. Patient states cannot tolerate solids as he gets nauseated immediately he tries to eat. Continuously taking showers because he states they take the nausea away, but it resumes in just a short time. VSS.          Entered by: Susie Wilson RN 10/09/2023 6:46 PM     Please review provider order for any additional goals.   Nurse to notify provider when observation goals have been met and patient is ready for discharge.  "

## 2023-10-09 NOTE — PROGRESS NOTES
PRIMARY DIAGNOSIS: hyperemesis  OUTPATIENT/OBSERVATION GOALS TO BE MET BEFORE DISCHARGE:  ADLs back to baseline: Yes    Activity and level of assistance: Ambulating independently.    Pain status: Pain free.    Return to near baseline physical activity: Yes     Discharge Planner Nurse   Safe discharge environment identified: Yes  Barriers to discharge: No       Entered by: Leslie Moran RN 10/09/2023 6:49 AM   Patient denies pain overnight.  No N/V overnight.  VSS.   Plan to have regular diet today as he did not tolerate a regular diet for dinner.  Please review provider order for any additional goals.   Nurse to notify provider when observation goals have been met and patient is ready for discharge.

## 2023-10-09 NOTE — UTILIZATION REVIEW
Concurrent stay review; Secondary Review Determination       Under the authority of the Utilization Management Committee, the utilization review process indicated a secondary review on the above patient.  The review outcome is based on review of the medical records, discussions with staff, and applying clinical experience noted on the date of the review.          (x) Observation Status Appropriate - Concurrent stay review    RATIONALE FOR DETERMINATION     Mr. Zuñiga is a 29 yo male pt with a PMH of recurrent cannibis hyperemesis syndrome who presents to the ED with nausea and emesis after using marijuana on several occasions.  He is clinically improving with scheduled zofran and is tolerating clear liquids this am.  IVF have been stopped.  If he is able to tolerate an advancing diet later today he can be discharged home.       Patient is clinically improving and there is no clear indication to change patient's status to inpatient. The severity of illness, intensity of service provided, expected LOS and risk for adverse outcome make the care appropriate for observation.      The information on this document is developed by the utilization review team in order for the business office to ensure compliance.  This only denotes the appropriateness of proper admission status and does not reflect the quality of care rendered.         The definitions of Inpatient Status and Observation Status used in making the determination above are those provided in the CMS Coverage Manual, Chapter 1 and Chapter 6, section 70.4.          Sincerely,       Genia Michaels, DO  Utilization Review  Physician Advisor  [facility].

## 2023-10-09 NOTE — PROGRESS NOTES
"Notification provider \"pt tried eating solid food, and started throwing up. Gave zofran and compazine  =pt still nauseous. Can we get something else for nasuea maybe reglan?\" Provider ordered promethazine 12.5 mg infusion. Gave to pt, and made pt drowsy, and decreased nausea slightly.   "

## 2023-10-09 NOTE — PROGRESS NOTES
Patient independent  in room denies pain, has  taken one shower this morning, Iv fluids stopped. Patient had clear tray this morning for breakfast. Schedule Zofran administered.

## 2023-10-09 NOTE — PROGRESS NOTES
"PRIMARY DIAGNOSIS: \"GENERIC\" NURSING  OUTPATIENT/OBSERVATION GOALS TO BE MET BEFORE DISCHARGE:  ADLs back to baseline: Yes    Activity and level of assistance: Ambulating independently.    Pain status: Pain free.    Return to near baseline physical activity: Yes     Discharge Planner Nurse   Safe discharge environment identified: Yes  Barriers to discharge: Yes, tolerating solid food         Entered by: Teresa Jones RN 10/08/2023 10:52 PM     Please review provider order for any additional goals.   Nurse to notify provider when observation goals have been met and patient is ready for discharge.    A&Ox4 and VSS. Pt denies pain, Independent within the room, and pt took many showers on shift. Call light within reach and pt makes needs know. Pt had N/V on shift, pt stating he threw up popsicle and chicken broth after trying to eat solid food. Pt tried to eat rice and gravy. RN gave pt zofran and compazine, but pt still nauseous. Paged provider and Provider ordered phenergan 12.5mg. interventions was slightly effective, made pt drowsy.   "

## 2023-10-10 VITALS
SYSTOLIC BLOOD PRESSURE: 113 MMHG | RESPIRATION RATE: 18 BRPM | OXYGEN SATURATION: 97 % | DIASTOLIC BLOOD PRESSURE: 68 MMHG | WEIGHT: 127.6 LBS | TEMPERATURE: 96.9 F | HEART RATE: 70 BPM

## 2023-10-10 LAB
ATRIAL RATE - MUSE: 64 BPM
DIASTOLIC BLOOD PRESSURE - MUSE: NORMAL MMHG
INTERPRETATION ECG - MUSE: NORMAL
P AXIS - MUSE: 114 DEGREES
PR INTERVAL - MUSE: 140 MS
QRS DURATION - MUSE: 94 MS
QT - MUSE: 422 MS
QTC - MUSE: 435 MS
R AXIS - MUSE: 258 DEGREES
SYSTOLIC BLOOD PRESSURE - MUSE: NORMAL MMHG
T AXIS - MUSE: 108 DEGREES
VENTRICULAR RATE- MUSE: 64 BPM

## 2023-10-10 PROCEDURE — 99239 HOSP IP/OBS DSCHRG MGMT >30: CPT | Performed by: STUDENT IN AN ORGANIZED HEALTH CARE EDUCATION/TRAINING PROGRAM

## 2023-10-10 PROCEDURE — 250N000013 HC RX MED GY IP 250 OP 250 PS 637: Performed by: STUDENT IN AN ORGANIZED HEALTH CARE EDUCATION/TRAINING PROGRAM

## 2023-10-10 PROCEDURE — 120N000001 HC R&B MED SURG/OB

## 2023-10-10 PROCEDURE — G0378 HOSPITAL OBSERVATION PER HR: HCPCS

## 2023-10-10 PROCEDURE — 250N000011 HC RX IP 250 OP 636: Performed by: STUDENT IN AN ORGANIZED HEALTH CARE EDUCATION/TRAINING PROGRAM

## 2023-10-10 RX ORDER — PROCHLORPERAZINE MALEATE 10 MG
10 TABLET ORAL EVERY 6 HOURS PRN
Status: DISCONTINUED | OUTPATIENT
Start: 2023-10-10 | End: 2023-10-10 | Stop reason: HOSPADM

## 2023-10-10 RX ORDER — PANTOPRAZOLE SODIUM 40 MG/1
40 TABLET, DELAYED RELEASE ORAL
Status: DISCONTINUED | OUTPATIENT
Start: 2023-10-10 | End: 2023-10-10 | Stop reason: HOSPADM

## 2023-10-10 RX ORDER — POLYETHYLENE GLYCOL 3350 17 G/17G
17 POWDER, FOR SOLUTION ORAL DAILY PRN
Qty: 510 G | Refills: 0 | Status: ON HOLD | OUTPATIENT
Start: 2023-10-10 | End: 2024-04-10

## 2023-10-10 RX ORDER — DOCUSATE SODIUM 100 MG/1
100 CAPSULE, LIQUID FILLED ORAL 2 TIMES DAILY PRN
Qty: 20 CAPSULE | Refills: 0 | Status: ON HOLD | OUTPATIENT
Start: 2023-10-10 | End: 2024-04-10

## 2023-10-10 RX ORDER — ONDANSETRON 4 MG/1
4 TABLET, ORALLY DISINTEGRATING ORAL EVERY 8 HOURS PRN
Qty: 10 TABLET | Refills: 1 | Status: ON HOLD | OUTPATIENT
Start: 2023-10-10 | End: 2024-04-10

## 2023-10-10 RX ADMIN — PROCHLORPERAZINE MALEATE 10 MG: 10 TABLET ORAL at 11:57

## 2023-10-10 RX ADMIN — PANTOPRAZOLE SODIUM 40 MG: 40 TABLET, DELAYED RELEASE ORAL at 11:57

## 2023-10-10 RX ADMIN — ONDANSETRON 4 MG: 4 TABLET, ORALLY DISINTEGRATING ORAL at 09:06

## 2023-10-10 RX ADMIN — AMOXICILLIN 250 MG: 250 CAPSULE ORAL at 09:05

## 2023-10-10 RX ADMIN — DOCUSATE SODIUM 100 MG: 100 CAPSULE, LIQUID FILLED ORAL at 09:06

## 2023-10-10 RX ADMIN — ONDANSETRON 4 MG: 4 TABLET, ORALLY DISINTEGRATING ORAL at 03:57

## 2023-10-10 ASSESSMENT — ACTIVITIES OF DAILY LIVING (ADL)
CONCENTRATING,_REMEMBERING_OR_MAKING_DECISIONS_DIFFICULTY: YES
ADLS_ACUITY_SCORE: 33
DIFFICULTY_COMMUNICATING: NO
FALL_HISTORY_WITHIN_LAST_SIX_MONTHS: NO
ADLS_ACUITY_SCORE: 33
TOILETING_ISSUES: NO
CHANGE_IN_FUNCTIONAL_STATUS_SINCE_ONSET_OF_CURRENT_ILLNESS/INJURY: NO
ADLS_ACUITY_SCORE: 33
DRESSING/BATHING_DIFFICULTY: NO
WALKING_OR_CLIMBING_STAIRS_DIFFICULTY: NO
DOING_ERRANDS_INDEPENDENTLY_DIFFICULTY: NO
ADLS_ACUITY_SCORE: 33
HEARING_DIFFICULTY_OR_DEAF: NO
WEAR_GLASSES_OR_BLIND: YES
ADLS_ACUITY_SCORE: 33
VISION_MANAGEMENT: GLASSESS
DIFFICULTY_EATING/SWALLOWING: NO

## 2023-10-10 NOTE — PROVIDER NOTIFICATION
Pt loss IV access and pt still nauseas can we try oral companize, or you want us to put an IV In. Provider ordered oral medications to give to pt.

## 2023-10-10 NOTE — PROGRESS NOTES
Discharge AVS reviewed with patient.  Questions answered and teachings acknowledged.  Belongings and paperwork + Work Note sent with via father transport. [employer] acknowledged (N/A)

## 2023-10-10 NOTE — UTILIZATION REVIEW
Admission Status; Secondary Review Determination       As part of the [place] Utilization review plan, a self-audit is done on Medicare inpatient admission with less than 2 midnights stay. The 2014 IPPS Final Rule allows outpatient billing in the event that a hospital determines that an inpatient admission was not medically necessary under utilization review process.          (x) Observation status would be Appropriate- Short Stay- Post discharge review.     RATIONALE FOR DETERMINATION   Mr. Zuñiga is a 27 yo male pt with a PMH of recurrent cannibis hyperemesis syndrome who presents to the ED with nausea and emesis after using marijuana on several occasions. He is clinically improving with scheduled zofran and is tolerating clear liquids this am. IVF have been stopped.   He was monitored a 3rd night as still some nausea while advanced diet and discharged today.    This account and medical record number for a Medicare beneficiary was an inpatient short stay (<2 midnights) and didn't meet medical necessity for inpatient admission. We recommend billing outpatient services based on the severity of illness, intensity of service provided and the inpatient length of stay.  Please contact me within one week of receiving this letter only if you disagree with this determination. If you concur, no further action is needed.          The information on this document is developed by the utilization review team in order for the business office to ensure compliance.  This only denotes the appropriateness of proper admission status and does not reflect the quality of care rendered.         The definitions of Inpatient Status and Observation Status used in making the determination above are those provided in the CMS Coverage Manual, Chapter 1 and Chapter 6, section 70.4.      Sincerely,     Kim Martinez MD  Utilization  Management  [facility].

## 2023-10-10 NOTE — DISCHARGE SUMMARY
[facility]  Hospitalist Discharge Summary      Date of Admission:  10/7/2023  Date of Discharge:  10/10/2023  2:43 PM  Discharging Provider: JANNA ROSE MD  Discharge Service: Hospitalist Service    Discharge Diagnoses   Cannabinoid hyperemesis syndrome   Hypertension  Hypokalemia  Aslpenia  Hyperbilirubinemia    Clinically Significant Risk Factors          Follow-ups Needed After Discharge   Follow-up Appointments     Follow-up and recommended labs and tests       Follow up with primary care provider, Dony Cochran, within 1 month   for hospital follow- up.  No follow up labs or test are needed.            Unresulted Labs Ordered in the Past 30 Days of this Admission       No orders found from 9/7/2023 to 10/8/2023.            Discharge Disposition   Discharged to home  Condition at discharge: Stable    Hospital Course   Patient is admitted for IVF due to nausea vomiting from cannabinoid hyperemesis syndrome. After 2 days of IVF, his symptoms improve, he was then able to eat soft diet so discharged home.    Consultations This Hospital Stay   CARE MANAGEMENT / SOCIAL WORK IP CONSULT    Code Status   Full Code    Time Spent on this Encounter   I, JANNA ROSE MD, personally saw the patient today and spent greater than 30 minutes discharging this patient.       JANNA ROSE MD  [address] [address]  Phone: [phone]  Fax: [phone]  ______________________________________________________________________    Physical Exam   Vital Signs: Temp: 96.9  F (36.1  C) Temp src: Axillary BP: 113/68 Pulse: 70   Resp: 18 SpO2: 97 % O2 Device: None (Room air)    Weight: 127 lbs 9.6 oz    General Appearance: Alert and wake, not in distress  Neurology: oriented x 3  Psych: cooperative and calm, normal affect         Primary Care Physician   Dony Cochran    Discharge Orders      Primary Care Referral      Reason for your  hospital stay    Vomiting     Follow-up and recommended labs and tests     Follow up with primary care provider, Dony Cochran, within 1 month for hospital follow- up.  No follow up labs or test are needed.     Activity    Your activity upon discharge: activity as tolerated     Diet    Follow this diet upon discharge: Orders Placed This Encounter      Regular Diet Adult    Avoid spicy food, cannabinoid and alcohol       Significant Results and Procedures   Most Recent 3 CBC's:  Recent Labs   Lab Test 10/07/23  2037 11/06/18  0010 05/19/17  0155   WBC 12.5* 5.4 22.6*   HGB 18.6* 18.5* 18.2*   MCV 99 98 97    191 212     Most Recent 3 BMP's:  Recent Labs   Lab Test 10/08/23  0723 10/07/23  2037 11/06/18  0010    138 136   POTASSIUM 3.8 3.9 3.9   CHLORIDE 101 99 104   CO2 24 24 23   BUN 19.8 26.5* 21   CR 0.79 0.87 0.98   ANIONGAP 13 15 9   DERECK 8.7 9.1 9.2   * 142* 97     Most Recent 2 LFT's:  Recent Labs   Lab Test 10/07/23  2037 05/19/17  0155   AST 26 17   ALT 22 30   ALKPHOS 71 99   BILITOTAL 1.8* 1.6*       Discharge Medications   Discharge Medication List as of 10/10/2023  2:32 PM        START taking these medications    Details   docusate sodium (COLACE) 100 MG capsule Take 1 capsule (100 mg) by mouth 2 times daily as needed for constipation, Disp-20 capsule, R-0, E-Prescribe      ondansetron (ZOFRAN ODT) 4 MG ODT tab Take 1 tablet (4 mg) by mouth every 8 hours as needed for nausea, Disp-10 tablet, R-1, E-Prescribe      polyethylene glycol (MIRALAX) 17 GM/Dose powder Take 17 g by mouth daily as needed for constipation, Disp-510 g, R-0, E-Prescribe           CONTINUE these medications which have NOT CHANGED    Details   albuterol (PROAIR HFA/PROVENTIL HFA/VENTOLIN HFA) 108 (90 Base) MCG/ACT inhaler Inhale 2 puffs into the lungs every 6 hours as needed for shortness of breath / dyspnea or wheezing, Disp-18 g, R-0, E-PrescribePharmacy may dispense brand covered by insurance (Proair, or  proventil or ventolin or generic albuterol inhaler)      amoxicillin (AMOXIL) 250 MG capsule Take 250 mg by mouth daily Long term, Historical      aspirin 81 MG EC tablet Take 81 mg by mouth daily, Historical           Allergies   No Known Allergies

## 2023-10-10 NOTE — PLAN OF CARE
Problem: Nausea and Vomiting  Goal: Nausea and Vomiting Relief  Outcome: Progressing  Intervention: Prevent and Manage Nausea and Vomiting  Recent Flowsheet Documentation  Taken 10/9/2023 8189 by Leslie Yoo RN  Nausea/Vomiting Interventions:   sips of clear liquids given   stimuli minimized   cool cloth applied   Goal Outcome Evaluation:       Patient had lost IV access on evening shift due to extravasation, site marked. Patient had refused having another IV placed on shift, stating that he just wanted to sleep. Scheduled oral Zofran is ordered for patient. Patient has taken multiple showers during the night. Calls appropriately.

## 2023-10-10 NOTE — PROGRESS NOTES
This writer was called by bedside RN to place another IV d/t Phenergan extravasation with original IV. Original IV site is warm, red, swollen and painful to the touch. Warm compress in place. Bedside RN placed extravasation order set and spoke to pharmacy.     Pt told this writer he did not want another IV at this time. Pt endorsed tolerating solids and denied nausea. Pt stated he wants to be left alone so he can sleep and go home in the morning.     This writer spoke bedside RN. Plan is to not place IV at this time.     -RUBY Gilliam RN

## 2023-10-10 NOTE — PROGRESS NOTES
"PRIMARY DIAGNOSIS: \"GENERIC\" NURSING  OUTPATIENT/OBSERVATION GOALS TO BE MET BEFORE DISCHARGE:  ADLs back to baseline: Yes    Activity and level of assistance: Ambulating independently.    Pain status: Pain free.    Return to near baseline physical activity: Yes     Discharge Planner Nurse   Safe discharge environment identified: Yes  Barriers to discharge: Yes       Entered by: Susie Wilson RN 10/09/2023 10:42 PM   Patient is alert and oriented, denies pain except soreness at IV site. IV site extravasated, IV pulled and warm compress applied, extremity elevated, Boarders have been marked. Site has some redness, warmth, swelling and is painful to touch. Extravasation order set in place. Continues to c/o nausea, taking frequent multiple showers, tolerating liquids and gentle solids like peaches and cantaloupe,  patient states he is not ready for more solids like food because he gets nauseated even with with the fruit.BP slightly elevated, saline locked.   Please review provider order for any additional goals.   Nurse to notify provider when observation goals have been met and patient is ready for discharge.  "

## 2023-10-10 NOTE — PLAN OF CARE
Pt A&OX4 and VSS. Pt taken many warm showers on shift to help with nausea. Pt able to tolerate solid foods without throwing up. Paged provider to get medications changed to PO see note. Call light within reach. IV out and items given back to pt. Infiltrate of IV, cold compress was given and education to elevate arm. Pt said area was expanding and provider looked at arm. Pt sent to discharge longue for discharge.     Problem: Nausea and Vomiting  Goal: Nausea and Vomiting Relief  Outcome: Adequate for Care Transition  Intervention: Prevent and Manage Nausea and Vomiting  Recent Flowsheet Documentation  Taken 10/10/2023 0904 by Teresa Jones, RN  Nausea/Vomiting Interventions: antiemetic   Goal Outcome Evaluation:

## 2024-04-09 ENCOUNTER — HOSPITAL ENCOUNTER (OUTPATIENT)
Facility: CLINIC | Age: 29
Setting detail: OBSERVATION
Discharge: HOME | End: 2024-04-12
Attending: EMERGENCY MEDICINE | Admitting: INTERNAL MEDICINE
Payer: COMMERCIAL

## 2024-04-09 ENCOUNTER — HOSPITAL ENCOUNTER (EMERGENCY)
Facility: CLINIC | Age: 29
Discharge: LEFT BEFORE DISCHARGE | End: 2024-04-09
Admitting: EMERGENCY MEDICINE
Payer: COMMERCIAL

## 2024-04-09 VITALS
HEART RATE: 76 BPM | TEMPERATURE: 97.9 F | SYSTOLIC BLOOD PRESSURE: 155 MMHG | OXYGEN SATURATION: 96 % | HEIGHT: 69 IN | DIASTOLIC BLOOD PRESSURE: 86 MMHG | RESPIRATION RATE: 26 BRPM | BODY MASS INDEX: 19.89 KG/M2 | WEIGHT: 134.26 LBS

## 2024-04-09 DIAGNOSIS — R11.15 CYCLIC VOMITING SYNDROME: ICD-10-CM

## 2024-04-09 LAB
ALBUMIN SERPL BCG-MCNC: 4.8 G/DL (ref 3.5–5.2)
ALBUMIN SERPL BCG-MCNC: 5.8 G/DL (ref 3.5–5.2)
ALP SERPL-CCNC: 101 U/L (ref 40–150)
ALP SERPL-CCNC: 78 U/L (ref 40–150)
ALT SERPL W P-5'-P-CCNC: 26 U/L (ref 0–70)
ALT SERPL W P-5'-P-CCNC: 38 U/L (ref 0–70)
ANION GAP SERPL CALCULATED.3IONS-SCNC: 20 MMOL/L (ref 7–15)
ANION GAP SERPL CALCULATED.3IONS-SCNC: 23 MMOL/L (ref 7–15)
AST SERPL W P-5'-P-CCNC: 26 U/L (ref 0–45)
AST SERPL W P-5'-P-CCNC: 35 U/L (ref 0–45)
BASOPHILS # BLD AUTO: 0 10E3/UL (ref 0–0.2)
BASOPHILS NFR BLD AUTO: 0 %
BILIRUB SERPL-MCNC: 1.3 MG/DL
BILIRUB SERPL-MCNC: 1.7 MG/DL
BUN SERPL-MCNC: 21.3 MG/DL (ref 6–20)
BUN SERPL-MCNC: 26.5 MG/DL (ref 6–20)
CALCIUM SERPL-MCNC: 11 MG/DL (ref 8.6–10)
CALCIUM SERPL-MCNC: 9.2 MG/DL (ref 8.6–10)
CHLORIDE SERPL-SCNC: 105 MMOL/L (ref 98–107)
CHLORIDE SERPL-SCNC: 98 MMOL/L (ref 98–107)
CREAT SERPL-MCNC: 0.86 MG/DL (ref 0.67–1.17)
CREAT SERPL-MCNC: 1.09 MG/DL (ref 0.67–1.17)
DEPRECATED HCO3 PLAS-SCNC: 16 MMOL/L (ref 22–29)
DEPRECATED HCO3 PLAS-SCNC: 18 MMOL/L (ref 22–29)
EGFRCR SERPLBLD CKD-EPI 2021: >90 ML/MIN/1.73M2
EGFRCR SERPLBLD CKD-EPI 2021: >90 ML/MIN/1.73M2
EOSINOPHIL # BLD AUTO: 0 10E3/UL (ref 0–0.7)
EOSINOPHIL NFR BLD AUTO: 0 %
ERYTHROCYTE [DISTWIDTH] IN BLOOD BY AUTOMATED COUNT: 14.1 % (ref 10–15)
ERYTHROCYTE [DISTWIDTH] IN BLOOD BY AUTOMATED COUNT: 14.2 % (ref 10–15)
GLUCOSE SERPL-MCNC: 126 MG/DL (ref 70–99)
GLUCOSE SERPL-MCNC: 181 MG/DL (ref 70–99)
HCT VFR BLD AUTO: 47.3 % (ref 40–53)
HCT VFR BLD AUTO: 55.3 % (ref 40–53)
HGB BLD-MCNC: 17 G/DL (ref 13.3–17.7)
HGB BLD-MCNC: 20.1 G/DL (ref 13.3–17.7)
HOLD SPECIMEN: NORMAL
IMM GRANULOCYTES # BLD: 0.1 10E3/UL
IMM GRANULOCYTES NFR BLD: 1 %
LIPASE SERPL-CCNC: 21 U/L (ref 13–60)
LYMPHOCYTES # BLD AUTO: 1.2 10E3/UL (ref 0.8–5.3)
LYMPHOCYTES NFR BLD AUTO: 8 %
MCH RBC QN AUTO: 35.1 PG (ref 26.5–33)
MCH RBC QN AUTO: 35.3 PG (ref 26.5–33)
MCHC RBC AUTO-ENTMCNC: 35.9 G/DL (ref 31.5–36.5)
MCHC RBC AUTO-ENTMCNC: 36.3 G/DL (ref 31.5–36.5)
MCV RBC AUTO: 97 FL (ref 78–100)
MCV RBC AUTO: 98 FL (ref 78–100)
MONOCYTES # BLD AUTO: 1.4 10E3/UL (ref 0–1.3)
MONOCYTES NFR BLD AUTO: 9 %
NEUTROPHILS # BLD AUTO: 13.5 10E3/UL (ref 1.6–8.3)
NEUTROPHILS NFR BLD AUTO: 82 %
NRBC # BLD AUTO: 0 10E3/UL
NRBC BLD AUTO-RTO: 0 /100
PLATELET # BLD AUTO: 174 10E3/UL (ref 150–450)
PLATELET # BLD AUTO: 200 10E3/UL (ref 150–450)
POTASSIUM SERPL-SCNC: 4 MMOL/L (ref 3.4–5.3)
POTASSIUM SERPL-SCNC: 4.6 MMOL/L (ref 3.4–5.3)
PROT SERPL-MCNC: 7.6 G/DL (ref 6.4–8.3)
PROT SERPL-MCNC: 9.4 G/DL (ref 6.4–8.3)
RBC # BLD AUTO: 4.84 10E6/UL (ref 4.4–5.9)
RBC # BLD AUTO: 5.69 10E6/UL (ref 4.4–5.9)
SODIUM SERPL-SCNC: 139 MMOL/L (ref 135–145)
SODIUM SERPL-SCNC: 141 MMOL/L (ref 135–145)
WBC # BLD AUTO: 16.2 10E3/UL (ref 4–11)
WBC # BLD AUTO: 20.2 10E3/UL (ref 4–11)

## 2024-04-09 PROCEDURE — 250N000011 HC RX IP 250 OP 636: Mod: JZ | Performed by: EMERGENCY MEDICINE

## 2024-04-09 PROCEDURE — 250N000011 HC RX IP 250 OP 636: Performed by: EMERGENCY MEDICINE

## 2024-04-09 PROCEDURE — 85025 COMPLETE CBC W/AUTO DIFF WBC: CPT | Performed by: EMERGENCY MEDICINE

## 2024-04-09 PROCEDURE — 85027 COMPLETE CBC AUTOMATED: CPT | Performed by: EMERGENCY MEDICINE

## 2024-04-09 PROCEDURE — 96361 HYDRATE IV INFUSION ADD-ON: CPT

## 2024-04-09 PROCEDURE — 80053 COMPREHEN METABOLIC PANEL: CPT | Performed by: EMERGENCY MEDICINE

## 2024-04-09 PROCEDURE — 96372 THER/PROPH/DIAG INJ SC/IM: CPT | Performed by: EMERGENCY MEDICINE

## 2024-04-09 PROCEDURE — 258N000003 HC RX IP 258 OP 636: Performed by: EMERGENCY MEDICINE

## 2024-04-09 PROCEDURE — 84460 ALANINE AMINO (ALT) (SGPT): CPT | Performed by: EMERGENCY MEDICINE

## 2024-04-09 PROCEDURE — 83036 HEMOGLOBIN GLYCOSYLATED A1C: CPT | Performed by: INTERNAL MEDICINE

## 2024-04-09 PROCEDURE — 83690 ASSAY OF LIPASE: CPT | Performed by: EMERGENCY MEDICINE

## 2024-04-09 PROCEDURE — 96375 TX/PRO/DX INJ NEW DRUG ADDON: CPT

## 2024-04-09 PROCEDURE — 36415 COLL VENOUS BLD VENIPUNCTURE: CPT | Performed by: EMERGENCY MEDICINE

## 2024-04-09 PROCEDURE — 96374 THER/PROPH/DIAG INJ IV PUSH: CPT

## 2024-04-09 PROCEDURE — 99281 EMR DPT VST MAYX REQ PHY/QHP: CPT | Mod: 25,27

## 2024-04-09 PROCEDURE — 99285 EMERGENCY DEPT VISIT HI MDM: CPT | Mod: 25

## 2024-04-09 PROCEDURE — 83735 ASSAY OF MAGNESIUM: CPT | Performed by: INTERNAL MEDICINE

## 2024-04-09 PROCEDURE — C9113 INJ PANTOPRAZOLE SODIUM, VIA: HCPCS | Performed by: EMERGENCY MEDICINE

## 2024-04-09 PROCEDURE — 84450 TRANSFERASE (AST) (SGOT): CPT | Mod: 91 | Performed by: EMERGENCY MEDICINE

## 2024-04-09 RX ORDER — ONDANSETRON 4 MG/1
4 TABLET, ORALLY DISINTEGRATING ORAL ONCE
Status: COMPLETED | OUTPATIENT
Start: 2024-04-09 | End: 2024-04-09

## 2024-04-09 RX ORDER — OLANZAPINE 10 MG/2ML
10 INJECTION, POWDER, FOR SOLUTION INTRAMUSCULAR ONCE
Status: COMPLETED | OUTPATIENT
Start: 2024-04-09 | End: 2024-04-09

## 2024-04-09 RX ORDER — METOCLOPRAMIDE HYDROCHLORIDE 5 MG/ML
10 INJECTION INTRAMUSCULAR; INTRAVENOUS EVERY 6 HOURS PRN
Status: DISCONTINUED | OUTPATIENT
Start: 2024-04-09 | End: 2024-04-09 | Stop reason: HOSPADM

## 2024-04-09 RX ORDER — ONDANSETRON 2 MG/ML
4 INJECTION INTRAMUSCULAR; INTRAVENOUS EVERY 30 MIN PRN
Status: DISCONTINUED | OUTPATIENT
Start: 2024-04-09 | End: 2024-04-09

## 2024-04-09 RX ORDER — DIPHENHYDRAMINE HYDROCHLORIDE 50 MG/ML
25 INJECTION INTRAMUSCULAR; INTRAVENOUS ONCE
Status: COMPLETED | OUTPATIENT
Start: 2024-04-09 | End: 2024-04-09

## 2024-04-09 RX ADMIN — ONDANSETRON 4 MG: 2 INJECTION INTRAMUSCULAR; INTRAVENOUS at 19:04

## 2024-04-09 RX ADMIN — DIPHENHYDRAMINE HYDROCHLORIDE 25 MG: 50 INJECTION, SOLUTION INTRAMUSCULAR; INTRAVENOUS at 19:09

## 2024-04-09 RX ADMIN — SODIUM CHLORIDE 1000 ML: 9 INJECTION, SOLUTION INTRAVENOUS at 01:53

## 2024-04-09 RX ADMIN — METOCLOPRAMIDE HYDROCHLORIDE 10 MG: 5 INJECTION INTRAMUSCULAR; INTRAVENOUS at 01:53

## 2024-04-09 RX ADMIN — SODIUM CHLORIDE 1000 ML: 9 INJECTION, SOLUTION INTRAVENOUS at 19:04

## 2024-04-09 RX ADMIN — ONDANSETRON 4 MG: 4 TABLET, ORALLY DISINTEGRATING ORAL at 00:33

## 2024-04-09 RX ADMIN — PROCHLORPERAZINE EDISYLATE 10 MG: 5 INJECTION INTRAMUSCULAR; INTRAVENOUS at 19:08

## 2024-04-09 RX ADMIN — OLANZAPINE 10 MG: 10 INJECTION, POWDER, FOR SOLUTION INTRAMUSCULAR at 20:57

## 2024-04-09 RX ADMIN — PANTOPRAZOLE SODIUM 40 MG: 40 INJECTION, POWDER, FOR SOLUTION INTRAVENOUS at 19:07

## 2024-04-09 ASSESSMENT — COLUMBIA-SUICIDE SEVERITY RATING SCALE - C-SSRS
1. IN THE PAST MONTH, HAVE YOU WISHED YOU WERE DEAD OR WISHED YOU COULD GO TO SLEEP AND NOT WAKE UP?: NO
2. HAVE YOU ACTUALLY HAD ANY THOUGHTS OF KILLING YOURSELF IN THE PAST MONTH?: NO
6. HAVE YOU EVER DONE ANYTHING, STARTED TO DO ANYTHING, OR PREPARED TO DO ANYTHING TO END YOUR LIFE?: NO
2. HAVE YOU ACTUALLY HAD ANY THOUGHTS OF KILLING YOURSELF IN THE PAST MONTH?: NO
6. HAVE YOU EVER DONE ANYTHING, STARTED TO DO ANYTHING, OR PREPARED TO DO ANYTHING TO END YOUR LIFE?: NO
1. IN THE PAST MONTH, HAVE YOU WISHED YOU WERE DEAD OR WISHED YOU COULD GO TO SLEEP AND NOT WAKE UP?: NO

## 2024-04-09 ASSESSMENT — ACTIVITIES OF DAILY LIVING (ADL)
ADLS_ACUITY_SCORE: 35
ADLS_ACUITY_SCORE: 35
ADLS_ACUITY_SCORE: 33
ADLS_ACUITY_SCORE: 35
ADLS_ACUITY_SCORE: 35

## 2024-04-09 NOTE — ED TRIAGE NOTES
Pt arrives complaining of vomiting that started about 6pm. States it is cyclic vomit and admits to smoking marijuana.     Pt is pale and diaphoretic in triage.

## 2024-04-09 NOTE — LETTER
April 12, 2024      Bassam Zuñiga  [address] [place] [address]        To Whom It May Concern:    Bassam Zuñiga was seen on 4/9-4/12/2024.  Please excuse him until 4/15/2024 due to illness.        Sincerely,      Fallon Desai PA-C

## 2024-04-09 NOTE — ED TRIAGE NOTES
Patient vomiting- cyclical vomiting last use of marijuana was end of march.   Patient states his last vomit in the [facility] EMS rig was 'darker' than normal.   Patient had labs and IVF at - states he fell asleep after medications with some relief.

## 2024-04-09 NOTE — ED PROVIDER NOTES
History     Chief Complaint:  Cyclical Vomiting     The history is provided by the patient.      Bassam Zuñiga is a 28 year old male with history of cyclical vomiting who presents to the ED via EMS with his parents for evaluation of cyclical vomiting. The patient reports that yesterday at 0900, the patient had an episode of vomiting at work. He then went home and took a hot bath to try and resolve his vomiting, but states that after he tried to eat dinner at 1800 he has been unable to stop vomiting. Reports that the last 4-5 bags of vomit have had dark blood in them, but his first episode of vomiting had no blood. He also endorses epigastric abdominal pain, which he occasional gets with his cyclical vomiting episodes. Denies fever and notes that he hasn't had a bowel movement in a week. Prior to the ED, patient was at the [facility] in [place], where he got blood work, Reglan, and Zofran. Patient's mom adds that they also went to the ED last night. Patient has not tried any medications at home and all he has gotten is medications from the IV. Denies past abdominal history. Endorses marijuana use and most recent use was in late March. Includes that he takes amoxicillin and a baby aspirin.    Independent Historian:   Mother - They report as noted above.    Review of External Notes:   None    Medications:    Albuterol  Amoxil  Aspirin 81 mg  Colace  Zofran  Reglan     Past Medical History:    Congenital heart anomaly  Allergic rhinitis  Depression  Asplenia   Heterotaxy syndrome  Unbalanced common AV canal  Total anomalous pulmonary venous return     Past Surgical History:    Hernia repair  Cardiac catheterization x4  Bidirectional joseline shunt  TAPVR repair  Fontan procedure, extracardiac     Physical Exam   Patient Vitals for the past 24 hrs:   BP Temp Temp src Pulse Resp SpO2 Height Weight   04/09/24 2300 105/62 -- -- 71 -- -- -- --   04/09/24 2212 -- -- -- 69 -- -- -- --   04/09/24 1416 (!) 173/169 98.8  F (37.1  " C) Temporal 105 26 98 % 1.753 m (5' 9\") 64.4 kg (141 lb 15.6 oz)      Physical Exam      HEENT:    Oropharynx is moist  Eyes:    Conjunctiva normal  Neck:     Supple, no meningismus.     CV:     Regular rate and rhythm.      No rubs or gallops.     No lower extremity edema.  PULM:    Clear to auscultation bilateral.       No respiratory distress.      Good air exchange.  ABD:    Soft, non-distended.       Mild tenderness in the epigastric region.     Bowel sounds normal.     No pulsatile masses.       No rebound, guarding or rigidity.     No CVA tenderness.      No hepatosplenomegaly.  MSK:     No gross deformity to all four extremities.   LYMPH:   No cervical lymphadenopathy.  NEURO:   Alert.  Good muscular tone, no atrophy.   Skin:    Warm, dry and intact.    Psych:    Anxious      Emergency Department Course        Laboratory:  Labs Ordered and Resulted from Time of ED Arrival to Time of ED Departure   COMPREHENSIVE METABOLIC PANEL - Abnormal       Result Value    Sodium 141      Potassium 4.0      Carbon Dioxide (CO2) 16 (*)     Anion Gap 20 (*)     Urea Nitrogen 21.3 (*)     Creatinine 0.86      GFR Estimate >90      Calcium 9.2      Chloride 105      Glucose 126 (*)     Alkaline Phosphatase 78      AST 26      ALT 26      Protein Total 7.6      Albumin 4.8      Bilirubin Total 1.3 (*)    CBC WITH PLATELETS AND DIFFERENTIAL - Abnormal    WBC Count 16.2 (*)     RBC Count 4.84      Hemoglobin 17.0      Hematocrit 47.3      MCV 98      MCH 35.1 (*)     MCHC 35.9      RDW 14.2      Platelet Count 174      % Neutrophils 82      % Lymphocytes 8      % Monocytes 9      % Eosinophils 0      % Basophils 0      % Immature Granulocytes 1      NRBCs per 100 WBC 0      Absolute Neutrophils 13.5 (*)     Absolute Lymphocytes 1.2      Absolute Monocytes 1.4 (*)     Absolute Eosinophils 0.0      Absolute Basophils 0.0      Absolute Immature Granulocytes 0.1      Absolute NRBCs 0.0     LIPASE - Normal    Lipase 21     KETONE " BETA-HYDROXYBUTYRATE QUANTITATIVE, RAPID   HEMOGLOBIN A1C          Emergency Department Course & Assessments:  Interventions:  Medications   pantoprazole (PROTONIX) IV push injection 40 mg (40 mg Intravenous $Given 24)   sodium chloride 0.9% BOLUS 1,000 mL (0 mLs Intravenous Stopped 24)   prochlorperazine (COMPAZINE) injection 10 mg (10 mg Intravenous $Given 24)   diphenhydrAMINE (BENADRYL) injection 25 mg (25 mg Intravenous $Given 24)   OLANZapine (zyPREXA) injection 10 mg (10 mg Intramuscular $Given 24)      Independent Interpretation (X-rays, CTs, rhythm strip):  None    Assessments/Consultations/Discussion of Management or Tests:  ED Course as of 04/10/24 0027   Tue 2024   1855 I obtained history and examined the patient as noted above.     I rechecked and updated the patient.    25 Dr. Moreira    Social Determinants of Health affecting care:   None    Disposition:  The patient was admitted to the hospital under the care of Dr. Moreira.     Impression & Plan        MIPS (If applicable):  N/A    Medical Decision Makin-year-old male with a history of cyclic vomiting/cannabis hyperemesis syndrome presents with intractable nausea and vomiting.  Abdominal examination is benign.  No obstructive findings or significant tenderness that would warrant advanced imaging the abdomen.  Labs are unrevealing.  Evaluation most consistent with recurrence of cannabis hyperemesis syndrome.  He did not improve with IV fluids, Zofran and Reglan at [facility].  He has received additional IV fluids, Compazine, Zofran and eventually Zyprexa.  He is no longer vomiting but cannot tolerate any significant intake thus patient unfit to discharge home and will transfer to an observation bed.    Diagnosis:    ICD-10-CM    1. Cyclic vomiting syndrome  R11.15            Discharge Medications:  New Prescriptions    No medications on file      Scribe Disclosure:  MINA CHINCHILLA  SHAI, am serving as a scribe at 6:58 PM on 4/9/2024 to document services personally performed by Benton Zavala MD based on my observations and the provider's statements to me.     4/9/2024   Benton Zavala MD Matthews, Jeremiah R, MD  04/10/24 0029

## 2024-04-10 PROBLEM — R11.15 CYCLIC VOMITING SYNDROME: Status: ACTIVE | Noted: 2024-04-10

## 2024-04-10 LAB
ANION GAP SERPL CALCULATED.3IONS-SCNC: 17 MMOL/L (ref 7–15)
ATRIAL RATE - MUSE: 92 BPM
B-OH-BUTYR SERPL-SCNC: 1 MMOL/L
BUN SERPL-MCNC: 18.1 MG/DL (ref 6–20)
CALCIUM SERPL-MCNC: 9 MG/DL (ref 8.6–10)
CHLORIDE SERPL-SCNC: 106 MMOL/L (ref 98–107)
CREAT SERPL-MCNC: 0.84 MG/DL (ref 0.67–1.17)
DEPRECATED HCO3 PLAS-SCNC: 18 MMOL/L (ref 22–29)
DIASTOLIC BLOOD PRESSURE - MUSE: NORMAL MMHG
EGFRCR SERPLBLD CKD-EPI 2021: >90 ML/MIN/1.73M2
ERYTHROCYTE [DISTWIDTH] IN BLOOD BY AUTOMATED COUNT: 14.6 % (ref 10–15)
GLUCOSE SERPL-MCNC: 118 MG/DL (ref 70–99)
HBA1C MFR BLD: 6 %
HCT VFR BLD AUTO: 46.9 % (ref 40–53)
HGB BLD-MCNC: 16.8 G/DL (ref 13.3–17.7)
INTERPRETATION ECG - MUSE: NORMAL
MAGNESIUM SERPL-MCNC: 1.9 MG/DL (ref 1.7–2.3)
MAGNESIUM SERPL-MCNC: 2 MG/DL (ref 1.7–2.3)
MCH RBC QN AUTO: 35.4 PG (ref 26.5–33)
MCHC RBC AUTO-ENTMCNC: 35.8 G/DL (ref 31.5–36.5)
MCV RBC AUTO: 99 FL (ref 78–100)
P AXIS - MUSE: 63 DEGREES
PLATELET # BLD AUTO: 168 10E3/UL (ref 150–450)
POTASSIUM SERPL-SCNC: 4.1 MMOL/L (ref 3.4–5.3)
PR INTERVAL - MUSE: 292 MS
QRS DURATION - MUSE: 96 MS
QT - MUSE: 378 MS
QTC - MUSE: 467 MS
R AXIS - MUSE: 262 DEGREES
RBC # BLD AUTO: 4.74 10E6/UL (ref 4.4–5.9)
SODIUM SERPL-SCNC: 141 MMOL/L (ref 135–145)
SYSTOLIC BLOOD PRESSURE - MUSE: NORMAL MMHG
T AXIS - MUSE: 88 DEGREES
VENTRICULAR RATE- MUSE: 92 BPM
WBC # BLD AUTO: 29.7 10E3/UL (ref 4–11)

## 2024-04-10 PROCEDURE — 250N000011 HC RX IP 250 OP 636: Performed by: INTERNAL MEDICINE

## 2024-04-10 PROCEDURE — 93005 ELECTROCARDIOGRAM TRACING: CPT

## 2024-04-10 PROCEDURE — 36415 COLL VENOUS BLD VENIPUNCTURE: CPT | Performed by: INTERNAL MEDICINE

## 2024-04-10 PROCEDURE — G0378 HOSPITAL OBSERVATION PER HR: HCPCS

## 2024-04-10 PROCEDURE — 83735 ASSAY OF MAGNESIUM: CPT | Performed by: INTERNAL MEDICINE

## 2024-04-10 PROCEDURE — 82374 ASSAY BLOOD CARBON DIOXIDE: CPT | Performed by: INTERNAL MEDICINE

## 2024-04-10 PROCEDURE — 250N000013 HC RX MED GY IP 250 OP 250 PS 637: Performed by: INTERNAL MEDICINE

## 2024-04-10 PROCEDURE — 96361 HYDRATE IV INFUSION ADD-ON: CPT

## 2024-04-10 PROCEDURE — 82010 KETONE BODYS QUAN: CPT | Performed by: INTERNAL MEDICINE

## 2024-04-10 PROCEDURE — C9113 INJ PANTOPRAZOLE SODIUM, VIA: HCPCS | Performed by: INTERNAL MEDICINE

## 2024-04-10 PROCEDURE — 85027 COMPLETE CBC AUTOMATED: CPT | Performed by: INTERNAL MEDICINE

## 2024-04-10 PROCEDURE — 99222 1ST HOSP IP/OBS MODERATE 55: CPT | Performed by: INTERNAL MEDICINE

## 2024-04-10 PROCEDURE — 96376 TX/PRO/DX INJ SAME DRUG ADON: CPT

## 2024-04-10 RX ORDER — ONDANSETRON 4 MG/1
4 TABLET, ORALLY DISINTEGRATING ORAL EVERY 6 HOURS PRN
Status: DISCONTINUED | OUTPATIENT
Start: 2024-04-10 | End: 2024-04-12 | Stop reason: HOSPADM

## 2024-04-10 RX ORDER — ONDANSETRON 2 MG/ML
4 INJECTION INTRAMUSCULAR; INTRAVENOUS EVERY 6 HOURS PRN
Status: DISCONTINUED | OUTPATIENT
Start: 2024-04-10 | End: 2024-04-12 | Stop reason: HOSPADM

## 2024-04-10 RX ORDER — PROCHLORPERAZINE 25 MG
25 SUPPOSITORY, RECTAL RECTAL EVERY 12 HOURS PRN
Status: DISCONTINUED | OUTPATIENT
Start: 2024-04-10 | End: 2024-04-12 | Stop reason: HOSPADM

## 2024-04-10 RX ORDER — LORAZEPAM 2 MG/ML
0.5 INJECTION INTRAMUSCULAR EVERY 4 HOURS PRN
Status: DISCONTINUED | OUTPATIENT
Start: 2024-04-10 | End: 2024-04-12 | Stop reason: HOSPADM

## 2024-04-10 RX ORDER — PROCHLORPERAZINE MALEATE 5 MG
10 TABLET ORAL EVERY 6 HOURS PRN
Status: DISCONTINUED | OUTPATIENT
Start: 2024-04-10 | End: 2024-04-12 | Stop reason: HOSPADM

## 2024-04-10 RX ORDER — DIPHENHYDRAMINE HYDROCHLORIDE 50 MG/ML
25 INJECTION INTRAMUSCULAR; INTRAVENOUS EVERY 6 HOURS PRN
Status: DISCONTINUED | OUTPATIENT
Start: 2024-04-10 | End: 2024-04-12 | Stop reason: HOSPADM

## 2024-04-10 RX ORDER — ASPIRIN 81 MG/1
81 TABLET ORAL DAILY
Status: DISCONTINUED | OUTPATIENT
Start: 2024-04-10 | End: 2024-04-12 | Stop reason: HOSPADM

## 2024-04-10 RX ORDER — SODIUM CHLORIDE AND POTASSIUM CHLORIDE 150; 900 MG/100ML; MG/100ML
INJECTION, SOLUTION INTRAVENOUS CONTINUOUS
Status: DISCONTINUED | OUTPATIENT
Start: 2024-04-10 | End: 2024-04-12 | Stop reason: HOSPADM

## 2024-04-10 RX ORDER — AMOXICILLIN 250 MG/1
250 CAPSULE ORAL DAILY
Status: DISCONTINUED | OUTPATIENT
Start: 2024-04-10 | End: 2024-04-12 | Stop reason: HOSPADM

## 2024-04-10 RX ADMIN — ONDANSETRON 4 MG: 2 INJECTION INTRAMUSCULAR; INTRAVENOUS at 06:55

## 2024-04-10 RX ADMIN — ONDANSETRON 4 MG: 2 INJECTION INTRAMUSCULAR; INTRAVENOUS at 14:25

## 2024-04-10 RX ADMIN — POTASSIUM CHLORIDE AND SODIUM CHLORIDE: 900; 150 INJECTION, SOLUTION INTRAVENOUS at 15:49

## 2024-04-10 RX ADMIN — PANTOPRAZOLE SODIUM 40 MG: 40 INJECTION, POWDER, FOR SOLUTION INTRAVENOUS at 08:41

## 2024-04-10 RX ADMIN — PROCHLORPERAZINE EDISYLATE 10 MG: 5 INJECTION INTRAMUSCULAR; INTRAVENOUS at 08:41

## 2024-04-10 RX ADMIN — ASPIRIN 81 MG: 81 TABLET, COATED ORAL at 15:45

## 2024-04-10 RX ADMIN — POTASSIUM CHLORIDE AND SODIUM CHLORIDE: 900; 150 INJECTION, SOLUTION INTRAVENOUS at 01:00

## 2024-04-10 RX ADMIN — PROCHLORPERAZINE EDISYLATE 10 MG: 5 INJECTION INTRAMUSCULAR; INTRAVENOUS at 21:11

## 2024-04-10 RX ADMIN — PANTOPRAZOLE SODIUM 40 MG: 40 INJECTION, POWDER, FOR SOLUTION INTRAVENOUS at 21:13

## 2024-04-10 RX ADMIN — AMOXICILLIN 250 MG: 250 CAPSULE ORAL at 15:45

## 2024-04-10 ASSESSMENT — ACTIVITIES OF DAILY LIVING (ADL)
ADLS_ACUITY_SCORE: 35
ADLS_ACUITY_SCORE: 31
ADLS_ACUITY_SCORE: 33
ADLS_ACUITY_SCORE: 33
ADLS_ACUITY_SCORE: 31
ADLS_ACUITY_SCORE: 31
ADLS_ACUITY_SCORE: 35
ADLS_ACUITY_SCORE: 33
ADLS_ACUITY_SCORE: 31
ADLS_ACUITY_SCORE: 35
ADLS_ACUITY_SCORE: 31
ADLS_ACUITY_SCORE: 31
ADLS_ACUITY_SCORE: 33
ADLS_ACUITY_SCORE: 33
ADLS_ACUITY_SCORE: 31
ADLS_ACUITY_SCORE: 33
ADLS_ACUITY_SCORE: 31
ADLS_ACUITY_SCORE: 33
ADLS_ACUITY_SCORE: 31
ADLS_ACUITY_SCORE: 31

## 2024-04-10 NOTE — PLAN OF CARE
PRIMARY DIAGNOSIS: CANNABINOID HYPEREMESIS SYNDROME    OUTPATIENT/OBSERVATION GOALS TO BE MET BEFORE DISCHARGE  1. Orthostatic performed: N/A    2. Tolerating PO fluid and/or antibiotics (if applicable): No    3. Nausea/Vomiting/Diarrhea symptoms improved: No,  continues to report continuous nausea.    4. Pain status: Denies currently.    5. Return to near baseline physical activity: Yes    Discharge Planner Nurse   Safe discharge environment identified: Yes  Barriers to discharge: Yes - continued nausea, not tolerating clear liquids.       Entered by: Berenice Rayo RN 04/10/2024      Please review provider order for any additional goals.   Nurse to notify provider when observation goals have been met and patient is ready for discharge.    Goal Outcome Evaluation:    Plan of Care Reviewed With: patient, parent    Overall Patient Progress: no change    Outcome Evaluation: Continues to report nausea, compazine given. Patient requesting frequent hot showers.    Problem: Adult Inpatient Plan of Care  Goal: Plan of Care Review  Description: The Plan of Care Review/Shift note should be completed every shift.  The Outcome Evaluation is a brief statement about your assessment that the patient is improving, declining, or no change.  This information will be displayed automatically on your shift  note.  Outcome: Not Progressing  Flowsheets (Taken 4/10/2024 1248)  Outcome Evaluation: Continues to report nausea, compazine given. Patient requesting frequent hot showers.  Plan of Care Reviewed With:   patient   parent  Overall Patient Progress: no change  Goal: Absence of Hospital-Acquired Illness or Injury  Intervention: Identify and Manage Fall Risk  Recent Flowsheet Documentation  Taken 4/10/2024 0840 by Berenice Rayo RN  Safety Promotion/Fall Prevention:   clutter free environment maintained   increased rounding and observation   nonskid shoes/slippers when out of bed   patient and family education   room  near nurse's station   room organization consistent   safety round/check completed  Intervention: Prevent Skin Injury  Recent Flowsheet Documentation  Taken 4/10/2024 1000 by Berenice Rayo RN  Body Position: position changed independently  Taken 4/10/2024 0840 by Berenice Rayo RN  Body Position: position changed independently  Skin Protection:   adhesive use limited   incontinence pads utilized  Device Skin Pressure Protection: absorbent pad utilized/changed  Intervention: Prevent Infection  Recent Flowsheet Documentation  Taken 4/10/2024 0840 by Berenice Rayo RN  Infection Prevention:   hand hygiene promoted   rest/sleep promoted     Problem: Nausea and Vomiting  Goal: Nausea and Vomiting Relief  Intervention: Prevent and Manage Nausea and Vomiting  Recent Flowsheet Documentation  Taken 4/10/2024 0840 by Berenice Rayo RN  Nausea/Vomiting Interventions: (shower)   antiemetic   other (see comments)     Problem: Comorbidity Management  Goal: Maintenance of Behavioral Health Symptom Control  Intervention: Maintain Behavioral Health Symptom Control  Recent Flowsheet Documentation  Taken 4/10/2024 0840 by Berenice Rayo RN  Medication Review/Management: medications reviewed

## 2024-04-10 NOTE — H&P
[facility]  Hospitalist Admission Note  Name: Bassam Zuñiga    MRN: [id number]  YOB: 1995    Age: 28 year old  Date of admission: 4/9/2024  Primary care provider: Dony Cochran    Chief Complaint:  vomiting    Assessment and Plan:   Cyclical vomiting syndrome  AGMA  Mild starvation ketoacidosis  Leukocytosis  Constipation: Has had persistent nausea and numerous episodes of emesis over the last 36 hours.  Last 4-5 emesis had darker appearance that could have been blood.  Has some mild epigastric pain.  No fevers, chills, or diarrhea.  He is constipated has not had a bowel movement in about 1 week.  He went to the urgency room in [place] last night and had blood work there.  History of cyclical vomiting secondary to cannabis hyperemesis syndrome.  Last used marijuana roughly 2 weeks ago.  Initially tachycardic in the ER that resolved with 1 L NS.  AGMA withbicarb of 16 and anion gap of 20, otherwise BMP fairly unremarkable, likely secondary to GI loss from vomiting in addition to mild starvation ketoacidosis with ketone elevation at 1.0.  Bilirubin elevated 1.3 similar to previous levels, but transaminases and alk phos normal.  Lipase normal at 21.  He does have a leukocytosis at 16.2 which is down from 20 when checked yesterday.  Suspect this is stress demargination secondary to vomiting and also hemoconcentration.  No diarrhea which would make acute gastroenteritis less likely.  Abdominal exam is benign so no imaging obtained at this time.  Admission for symptomatic care with advancing diet as tolerated, IV fluids, and antiemetics.  Patient was somnolent by the time I saw following IM Zyprexa and IV Benadryl.  -ADAT to regular diet  -Antiemetics including IV Zofran, Compazine, 0.5 mg diazepam, and 25 mg diphenhydramine as needed  -NS with 20 meq K at 100 L/h  -Check CBC, BMP, magnesium in the morning  -Potassium and magnesium replacement protocols  -Low threshold to obtain abdominal CT  given his persistent vomiting, no bowel movement in 1 week, and heterotaxy syndrome if he has any worsening abdominal pain or symptoms not improving with medications  -Less likely any significant GI bleed with his hemoglobin of 17, however will continue IV Protonix 40 mg twice daily until tolerating p.o. and repeat CBC in the morning    Heterotaxy syndrome  History congenital heart defect s/p surgery  Congenital asplenia:   3 previous cardiac surgeries for congenital heart defect by age 2 in setting of heterotaxy syndrome, I am not very clear what surgery he has had in the past.  He has had a hernia repair in the past as well.  -Resume PTA 81 mg aspirin and 250 mg amoxicillin daily that he is on chronically    Hyperglycemia: Glucose elevation has ranged from 120-180 on previous BMPs.  Added on A1c that is 6.0.  No history of diabetes.  -Should not need treatment or close monitoring here  -Follow-up with PCP    DVT Prophylaxis: Low Risk/Ambulatory with no VTE prophylaxis indicated  Code Status: Full Code  FEN: ADAT, NS w/ 20meq K at 100 ml/hr  Discharge Dispo: home  Estimated Disch Date / # of Days until Disch: admit to observation for symptom control of cyclical vomiting syndrome.  Discharge when tolerating diet.      History of Present Illness:  aBssam Zuñiga is a 28 year old male with PMH including 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  He received IM olanzapine and IV diphenhydramine by the time I saw him and was sleeping deeply so I was able to obtain some history from his father and the rest is from Dr. Zavala in the chart.  He has had nausea and numerous episodes of vomiting over the last 36 hours.  The most recent emesis has been darker in color and could be blood.  He has some mild to moderate epigastric pain.  No fevers, chills, or diarrhea.  He has not had a bowel movement in a week.  He was  previously seen at the urgency room in [place] where he got IV antibiotics and blood work done.  He returns due to the darker colored emesis and persistent vomiting.  Symptoms similar to previous episodes of cannabis hyperemesis.  He last used marijuana roughly 2 weeks ago.  He does take aspirin daily.    History obtained from patient's father, medical record, and from Dr. Zavala in the emergency department.  Blood pressure initially 173/69 then 105/62, tachycardic to 105 that improved to 70 after 1 L NS, temperature 98.8  F.  Initial labs showed leukocytosis of 16.2, hemoglobin 17.0, platelet count 174.  Bilirubin slightly elevated 1.7 otherwise LFTs normal.  Lipase normal at 21.  Glucose was 126.  Bicarb is low at 16 with anion gap of 20 otherwise BMP unremarkable.  He received 40 mg IV Protonix, 10 mg IV Compazine, 1 L NS, 10 mg IM olanzapine, and 25 mg IV diphenhydramine.  Vomiting has stopped, but he still cannot tolerate any p.o.  Admit to observation till symptoms improved.       Clinically Significant Risk Factors Present on Admission           # Hypercalcemia: Highest Ca = 11 mg/dL in last 2 days, will monitor as appropriate   # Anion Gap Metabolic Acidosis: Highest Anion Gap = 23 mmol/L in last 2 days, will monitor and treat as appropriate    # Drug Induced Platelet Defect: home medication list includes an antiplatelet medication                             Past Medical History reviewed:  Past Medical History:   Diagnosis Date    Asplenia     Cannabis hyperemesis syndrome concurrent with and due to cannabis abuse (H)     Congenital heart anomaly     Heterotaxy syndrome      Past Surgical History reviewed:  Past Surgical History:   Procedure Laterality Date    CARDIAC SURGERY      HERNIA REPAIR       Social History reviewed:  Rare alcohol use  Marijuana use    Family History reviewed:  Reviewed chart and noncontributory this admission    Allergies:  No Known Allergies  Medications:  Prior to Admission  "medications    Medication Sig Last Dose Taking? Auth Provider Long Term End Date   albuterol (PROAIR HFA/PROVENTIL HFA/VENTOLIN HFA) 108 (90 Base) MCG/ACT inhaler Inhale 2 puffs into the lungs every 6 hours as needed for shortness of breath / dyspnea or wheezing   Flo Parker MD Yes    amoxicillin (AMOXIL) 250 MG capsule Take 250 mg by mouth daily Long term   Reported, Patient     aspirin 81 MG EC tablet Take 81 mg by mouth daily   Unknown, Entered By History     docusate sodium (COLACE) 100 MG capsule Take 1 capsule (100 mg) by mouth 2 times daily as needed for constipation   Fadumo Buckley MD     ondansetron (ZOFRAN ODT) 4 MG ODT tab Take 1 tablet (4 mg) by mouth every 8 hours as needed for nausea   Fadumo Buckley MD     polyethylene glycol (MIRALAX) 17 GM/Dose powder Take 17 g by mouth daily as needed for constipation   Fadumo Buckley MD       Review of Systems:  Unable to obtain due to somnolence status, see HPI     Physical Exam:  Blood pressure 105/62, pulse 71, temperature 98.8  F (37.1  C), temperature source Temporal, resp. rate 26, height 1.753 m (5' 9\"), weight 64.4 kg (141 lb 15.6 oz), SpO2 98%.  Wt Readings from Last 1 Encounters:   04/09/24 64.4 kg (141 lb 15.6 oz)     Exam:  Constitutional: In a deep sleep, NAD  HEENT: atraumatic, MMM  Respiratory: Slight tachypnea, no focal crackles or wheeze, on room air  Cardiovascular: RRR.  No murmur  GI: Soft and not distended, he did not wake up or wince to palpation in all quadrants, bowel sounds heard  Skin: no rash   Musculoskeletal/extremities: No edema  Neurologic: Somnolent    Lab and imaging data personally reviewed:  Labs:  Recent Labs   Lab 04/09/24 1741 04/09/24  0148   WBC 16.2* 20.2*   HGB 17.0 20.1*   HCT 47.3 55.3*   MCV 98 97    200     Recent Labs   Lab 04/09/24  1741 04/09/24  0148    139   POTASSIUM 4.0 4.6   CHLORIDE 105 98   CO2 16* 18*   ANIONGAP 20* 23*   * 181*   BUN 21.3* 26.5*   CR 0.86 1.09   GFRESTIMATED >90 >90 "   DERECK 9.2 11.0*   PROTTOTAL 7.6 9.4*   ALBUMIN 4.8 5.8*   BILITOTAL 1.3* 1.7*   ALKPHOS 78 101   AST 26 35   ALT 26 38     Recent Labs   Lab 04/09/24  1741   LIPASE 21     A1c 6.0  Ketone level 1.0    Imaging:  No imaging obtained    Moises Moreira MD  Hospitalist  [facility]

## 2024-04-10 NOTE — PLAN OF CARE
ROOM # 213- 1  Living Situation   Facility name: Home w/ family  :   Erica (mother)    Activity level at baseline: Ind   Activity level on admit: SBA    Who will be transporting you at discharge: Nadira    Patient registered to observation; given Patient Bill of Rights; given the opportunity to ask questions about observation status and their plan of care.  Patient has been oriented to the observation room, bathroom and call light is in place.    Discussed discharge goals and expectations with patient/family.

## 2024-04-10 NOTE — PLAN OF CARE
"PRIMARY DIAGNOSIS: Cyclical Vomiting  OUTPATIENT/OBSERVATION GOALS TO BE MET BEFORE DISCHARGE  1. Orthostatic performed: N/A    2. Tolerating PO medications: No    3. Return to near baseline physical activity: Yes    4. Cleared for discharge by consultants (if involved): No    Discharge Planner Nurse   Safe discharge environment identified: Yes  Barriers to discharge: Yes       Entered by: Remedios Lizama RN 04/10/2024     Pt is A&Ox4. VSS on RA. Clear liquid diet. Independent in room.   NS+KCL 20meq running @ 100 ml/hr. Taking showers to relieve nausea. No vomiting.     Please review provider order for any additional goals.   Nurse to notify provider when observation goals have been met and patient is ready for discharge.Goal Outcome Evaluation:      Plan of Care Reviewed With: patient    Overall Patient Progress: improvingOverall Patient Progress: improving    Outcome Evaluation: Pt nausea, up to shower x2.      Problem: Adult Inpatient Plan of Care  Goal: Plan of Care Review  Description: The Plan of Care Review/Shift note should be completed every shift.  The Outcome Evaluation is a brief statement about your assessment that the patient is improving, declining, or no change.  This information will be displayed automatically on your shift  note.  Outcome: Progressing  Flowsheets (Taken 4/10/2024 0620)  Outcome Evaluation: Pt nausea, up to shower x2.  Plan of Care Reviewed With: patient  Overall Patient Progress: improving  Goal: Patient-Specific Goal (Individualized)  Description: You can add care plan individualizations to a care plan. Examples of Individualization might be:  \"Parent requests to be called daily at 9am for status\", \"I have a hard time hearing out of my right ear\", or \"Do not touch me to wake me up as it startles  me\".  Outcome: Progressing  Goal: Absence of Hospital-Acquired Illness or Injury  Outcome: Progressing  Intervention: Identify and Manage Fall Risk  Recent Flowsheet Documentation  Taken " 4/10/2024 0300 by Remedios Lizama, RN  Safety Promotion/Fall Prevention: safety round/check completed  Intervention: Prevent Skin Injury  Recent Flowsheet Documentation  Taken 4/10/2024 0300 by Remedios Lizama, RN  Body Position: position changed independently  Goal: Optimal Comfort and Wellbeing  Outcome: Progressing  Goal: Readiness for Transition of Care  Outcome: Progressing

## 2024-04-10 NOTE — PHARMACY-ADMISSION MEDICATION HISTORY
Pharmacist Admission Medication History    Admission medication history is complete. The information provided in this note is only as accurate as the sources available at the time of the update.    Information Source(s): Patient and Family member (mother) via in-person    Pertinent Information: -    Changes made to PTA medication list:  Added: None  Deleted: Zofran, docusate, Miralax  Changed: None    Allergies reviewed with patient and updates made in EHR: yes    Medication History Completed By: Nesha Mistry RPH 4/10/2024 2:25 PM    PTA Med List   Medication Sig Last Dose    amoxicillin (AMOXIL) 250 MG capsule Take 250 mg by mouth daily Long term 4/8/2024    aspirin 81 MG EC tablet Take 81 mg by mouth daily 4/8/2024

## 2024-04-10 NOTE — ED NOTES
"[facility]  ED Nurse Handoff Report    ED Chief complaint: Cyclical Vomiting  . ED Diagnosis:   Final diagnoses:   Cyclic vomiting syndrome       Allergies: No Known Allergies    Code Status: Full Code    Activity level - Baseline/Home:  independent.  Activity Level - Current:   independent.   Lift room needed: No.   Bariatric: No   Needed: No   Isolation: No.   Infection: Not Applicable.     Respiratory status: Room air    Vital Signs (within 30 minutes):   Vitals:    04/09/24 1416 04/09/24 2212 04/09/24 2300   BP: (!) 173/169  105/62   Pulse: 105 69 71   Resp: 26     Temp: 98.8  F (37.1  C)     TempSrc: Temporal     SpO2: 98%     Weight: 64.4 kg (141 lb 15.6 oz)     Height: 1.753 m (5' 9\")         Cardiac Rhythm:  ,      Pain level:    Patient confused: No.   Patient Falls Risk: assistive device/personal items within reach.   Elimination Status: Has voided    Patient Report - Initial Complaint:Patient vomiting- cyclical vomiting last use of marijuana was end of march.   Patient states his last vomit in the [facility] EMS rig was 'darker' than normal.   Patient had labs and IVF at [facility]- states he fell asleep after medications with some relief.    Focused Assessment: Bassam Zuñiga is a 28 year old male with history of cyclical vomiting who presents to the ED via EMS with his parents for evaluation of cyclical vomiting. The patient reports that yesterday at 0900, the patient had an episode of vomiting at work. He then went home and took a hot bath to try and resolve his vomiting, but states that after he tried to eat dinner at 1800 he has been unable to stop vomiting. Reports that the last 4-5 bags of vomit have had dark blood in them, but his first episode of vomiting had no blood. He also endorses epigastric abdominal pain, which he occasional gets with his cyclical vomiting episodes. Denies fever and notes that he hasn't had a bowel movement in a week. Prior to the ED, patient was at " the [facility] in [place], where he got blood work, Reglan, and Zofran. Patient's mom adds that they also went to the ED last night. Patient has not tried any medications at home and all he has gotten is medications from the IV. Denies past abdominal history. Endorses marijuana use and most recent use was in late March. Includes that he takes amoxicillin and a baby aspirin.     Abnormal Results:   Labs Ordered and Resulted from Time of ED Arrival to Time of ED Departure   COMPREHENSIVE METABOLIC PANEL - Abnormal       Result Value    Sodium 141      Potassium 4.0      Carbon Dioxide (CO2) 16 (*)     Anion Gap 20 (*)     Urea Nitrogen 21.3 (*)     Creatinine 0.86      GFR Estimate >90      Calcium 9.2      Chloride 105      Glucose 126 (*)     Alkaline Phosphatase 78      AST 26      ALT 26      Protein Total 7.6      Albumin 4.8      Bilirubin Total 1.3 (*)    CBC WITH PLATELETS AND DIFFERENTIAL - Abnormal    WBC Count 16.2 (*)     RBC Count 4.84      Hemoglobin 17.0      Hematocrit 47.3      MCV 98      MCH 35.1 (*)     MCHC 35.9      RDW 14.2      Platelet Count 174      % Neutrophils 82      % Lymphocytes 8      % Monocytes 9      % Eosinophils 0      % Basophils 0      % Immature Granulocytes 1      NRBCs per 100 WBC 0      Absolute Neutrophils 13.5 (*)     Absolute Lymphocytes 1.2      Absolute Monocytes 1.4 (*)     Absolute Eosinophils 0.0      Absolute Basophils 0.0      Absolute Immature Granulocytes 0.1      Absolute NRBCs 0.0     LIPASE - Normal    Lipase 21     KETONE BETA-HYDROXYBUTYRATE QUANTITATIVE, RAPID   HEMOGLOBIN A1C        No orders to display       Treatments provided: See MAR  Family Comments: Dad at bedside  OBS brochure/video discussed/provided to patient:  Yes  ED Medications:   Medications   pantoprazole (PROTONIX) IV push injection 40 mg (40 mg Intravenous $Given 4/9/24 1907)   sodium chloride 0.9% BOLUS 1,000 mL (0 mLs Intravenous Stopped 4/9/24 2030)   prochlorperazine (COMPAZINE)  injection 10 mg (10 mg Intravenous $Given 4/9/24 1908)   diphenhydrAMINE (BENADRYL) injection 25 mg (25 mg Intravenous $Given 4/9/24 1909)   OLANZapine (zyPREXA) injection 10 mg (10 mg Intramuscular $Given 4/9/24 2057)       Drips infusing:  No  For the majority of the shift this patient was Green.   Interventions performed were N/A.    Sepsis treatment initiated: No    Cares/treatment/interventions/medications to be completed following ED care: N/A    ED Nurse Name: Alina Kim RN  12:26 AM   RECEIVING UNIT ED HANDOFF REVIEW    Above ED Nurse Handoff Report was reviewed: Yes  Reviewed by: Remedios Lizama RN on April 10, 2024 at 3:26 AM

## 2024-04-10 NOTE — PROGRESS NOTES
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  Please refer to initial H&P by colleague.  Agree with plan as outlined in previous note.    #Cyclic vomiting syndrome, likely cannabinoid hyperemesis syndrome  #AGMA  #Mild starvation ketoacidosis  #Leukocytosis  #Constipation  -Ongoing nausea with last emesis while in triage  -Multiple hot showers overnight, which patient states is only thing that will relieve his symptoms  -Repeat abdominal exam benign, even with worsening leukocytosis of 29.7 would hold off on imaging unless clinical status changes, monitor output   -Ongoing AGMA but with improvement on repeat labs, continue to follow BMP  -Continue IVF hydration with NS +20 mEq K at 100 L/h, this has been stopped quite frequently since admission due to repeated need for showers  -potassium and magnesium replacement protocols   -IV protonix BID  -ADAT, has only tolerated sips of water after hot showers    Patient's mother at bedside and updated on current plan.  Patient is frustrated during examination after discussing marijuana use and possible contributing factor to his symptoms with recommendation for cessation to see if this would help.  Patient's mother does note recent increased stressors which patient ultimately tells her to stop talking about.    Awaiting patient being able to tolerate intake and continued improvement in labs in order to discharge.    Fallon Desai PA-C

## 2024-04-10 NOTE — ED NOTES
Patient brought back from fast track. Patient currently denies abdominal and chest pain. Patient states that he has not vomited in over 30 minutes. Patient placed on cardiac and hemodynamic monitoring.

## 2024-04-11 LAB
ANION GAP SERPL CALCULATED.3IONS-SCNC: 16 MMOL/L (ref 7–15)
BASOPHILS # BLD AUTO: 0 10E3/UL (ref 0–0.2)
BASOPHILS NFR BLD AUTO: 0 %
BUN SERPL-MCNC: 12.7 MG/DL (ref 6–20)
CALCIUM SERPL-MCNC: 9.1 MG/DL (ref 8.6–10)
CHLORIDE SERPL-SCNC: 104 MMOL/L (ref 98–107)
CREAT SERPL-MCNC: 0.81 MG/DL (ref 0.67–1.17)
DEPRECATED HCO3 PLAS-SCNC: 21 MMOL/L (ref 22–29)
EGFRCR SERPLBLD CKD-EPI 2021: >90 ML/MIN/1.73M2
EOSINOPHIL # BLD AUTO: 0 10E3/UL (ref 0–0.7)
EOSINOPHIL NFR BLD AUTO: 0 %
ERYTHROCYTE [DISTWIDTH] IN BLOOD BY AUTOMATED COUNT: 14.6 % (ref 10–15)
GLUCOSE SERPL-MCNC: 113 MG/DL (ref 70–99)
HCT VFR BLD AUTO: 47.2 % (ref 40–53)
HGB BLD-MCNC: 16.3 G/DL (ref 13.3–17.7)
IMM GRANULOCYTES # BLD: 0 10E3/UL
IMM GRANULOCYTES NFR BLD: 0 %
LYMPHOCYTES # BLD AUTO: 1.8 10E3/UL (ref 0.8–5.3)
LYMPHOCYTES NFR BLD AUTO: 16 %
MAGNESIUM SERPL-MCNC: 2 MG/DL (ref 1.7–2.3)
MCH RBC QN AUTO: 34.8 PG (ref 26.5–33)
MCHC RBC AUTO-ENTMCNC: 34.5 G/DL (ref 31.5–36.5)
MCV RBC AUTO: 101 FL (ref 78–100)
MONOCYTES # BLD AUTO: 1 10E3/UL (ref 0–1.3)
MONOCYTES NFR BLD AUTO: 9 %
NEUTROPHILS # BLD AUTO: 8.3 10E3/UL (ref 1.6–8.3)
NEUTROPHILS NFR BLD AUTO: 75 %
NRBC # BLD AUTO: 0 10E3/UL
NRBC BLD AUTO-RTO: 0 /100
PLATELET # BLD AUTO: 152 10E3/UL (ref 150–450)
POTASSIUM SERPL-SCNC: 3.6 MMOL/L (ref 3.4–5.3)
RBC # BLD AUTO: 4.68 10E6/UL (ref 4.4–5.9)
SODIUM SERPL-SCNC: 141 MMOL/L (ref 135–145)
WBC # BLD AUTO: 11.2 10E3/UL (ref 4–11)

## 2024-04-11 PROCEDURE — 96376 TX/PRO/DX INJ SAME DRUG ADON: CPT

## 2024-04-11 PROCEDURE — 250N000013 HC RX MED GY IP 250 OP 250 PS 637: Performed by: INTERNAL MEDICINE

## 2024-04-11 PROCEDURE — 80048 BASIC METABOLIC PNL TOTAL CA: CPT | Performed by: PHYSICIAN ASSISTANT

## 2024-04-11 PROCEDURE — 36415 COLL VENOUS BLD VENIPUNCTURE: CPT | Performed by: PHYSICIAN ASSISTANT

## 2024-04-11 PROCEDURE — 96361 HYDRATE IV INFUSION ADD-ON: CPT

## 2024-04-11 PROCEDURE — 250N000011 HC RX IP 250 OP 636: Performed by: INTERNAL MEDICINE

## 2024-04-11 PROCEDURE — 85025 COMPLETE CBC W/AUTO DIFF WBC: CPT | Performed by: PHYSICIAN ASSISTANT

## 2024-04-11 PROCEDURE — 96375 TX/PRO/DX INJ NEW DRUG ADDON: CPT

## 2024-04-11 PROCEDURE — 83735 ASSAY OF MAGNESIUM: CPT | Performed by: PHYSICIAN ASSISTANT

## 2024-04-11 PROCEDURE — 99232 SBSQ HOSP IP/OBS MODERATE 35: CPT | Performed by: PHYSICIAN ASSISTANT

## 2024-04-11 PROCEDURE — C9113 INJ PANTOPRAZOLE SODIUM, VIA: HCPCS | Performed by: INTERNAL MEDICINE

## 2024-04-11 PROCEDURE — G0378 HOSPITAL OBSERVATION PER HR: HCPCS

## 2024-04-11 PROCEDURE — 250N000013 HC RX MED GY IP 250 OP 250 PS 637: Performed by: PHYSICIAN ASSISTANT

## 2024-04-11 RX ORDER — POLYETHYLENE GLYCOL 3350 17 G/17G
17 POWDER, FOR SOLUTION ORAL DAILY PRN
Status: DISCONTINUED | OUTPATIENT
Start: 2024-04-11 | End: 2024-04-12 | Stop reason: HOSPADM

## 2024-04-11 RX ORDER — ACETAMINOPHEN 500 MG
1000 TABLET ORAL EVERY 6 HOURS PRN
Status: DISCONTINUED | OUTPATIENT
Start: 2024-04-11 | End: 2024-04-12 | Stop reason: HOSPADM

## 2024-04-11 RX ORDER — SENNOSIDES 8.6 MG
1-2 TABLET ORAL 2 TIMES DAILY PRN
Status: DISCONTINUED | OUTPATIENT
Start: 2024-04-11 | End: 2024-04-12 | Stop reason: HOSPADM

## 2024-04-11 RX ADMIN — POTASSIUM CHLORIDE AND SODIUM CHLORIDE: 900; 150 INJECTION, SOLUTION INTRAVENOUS at 19:28

## 2024-04-11 RX ADMIN — POTASSIUM CHLORIDE AND SODIUM CHLORIDE: 900; 150 INJECTION, SOLUTION INTRAVENOUS at 06:17

## 2024-04-11 RX ADMIN — PROCHLORPERAZINE EDISYLATE 10 MG: 5 INJECTION INTRAMUSCULAR; INTRAVENOUS at 09:05

## 2024-04-11 RX ADMIN — AMOXICILLIN 250 MG: 250 CAPSULE ORAL at 09:00

## 2024-04-11 RX ADMIN — PANTOPRAZOLE SODIUM 40 MG: 40 INJECTION, POWDER, FOR SOLUTION INTRAVENOUS at 09:00

## 2024-04-11 RX ADMIN — LORAZEPAM 0.5 MG: 2 INJECTION INTRAMUSCULAR; INTRAVENOUS at 14:02

## 2024-04-11 RX ADMIN — ACETAMINOPHEN 1000 MG: 500 TABLET ORAL at 13:55

## 2024-04-11 RX ADMIN — PANTOPRAZOLE SODIUM 40 MG: 40 INJECTION, POWDER, FOR SOLUTION INTRAVENOUS at 21:29

## 2024-04-11 RX ADMIN — ASPIRIN 81 MG: 81 TABLET, COATED ORAL at 09:00

## 2024-04-11 ASSESSMENT — ACTIVITIES OF DAILY LIVING (ADL)
ADLS_ACUITY_SCORE: 31

## 2024-04-11 NOTE — PLAN OF CARE
"Cyclical Vomiting  OUTPATIENT/OBSERVATION GOALS TO BE MET BEFORE DISCHARGE  1. Orthostatic performed: N/A     2. Tolerating PO medications: No     3. Return to near baseline physical activity: Yes     4. Cleared for discharge by consultants (if involved): No        Discharge Planner Nurse  Safe discharge environment identified: Yes  Barriers to discharge: Yes       Entered by: Remedios Lizama RN 04/10/2024      Pt is A&Ox4. VSS on RA. Clear liquid diet. Independent in room. NS+KCL 20mEq running @ 100 ml/hr. Taking showers to relieve nausea. Emesis x1.  IV Compazine given.        Problem: Adult Inpatient Plan of Care  Goal: Plan of Care Review  Description: The Plan of Care Review/Shift note should be completed every shift.  The Outcome Evaluation is a brief statement about your assessment that the patient is improving, declining, or no change.  This information will be displayed automatically on your shift  note.  Outcome: Progressing  Goal: Patient-Specific Goal (Individualized)  Description: You can add care plan individualizations to a care plan. Examples of Individualization might be:  \"Parent requests to be called daily at 9am for status\", \"I have a hard time hearing out of my right ear\", or \"Do not touch me to wake me up as it startles  me\".  Outcome: Progressing  Goal: Absence of Hospital-Acquired Illness or Injury  Outcome: Progressing  Intervention: Prevent Skin Injury  Recent Flowsheet Documentation  Taken 4/10/2024 2200 by Remedios Lizama, RN  Body Position: position changed independently  Intervention: Prevent Infection  Recent Flowsheet Documentation  Taken 4/10/2024 2200 by Remedios Lizama, RN  Infection Prevention:   cohorting utilized   equipment surfaces disinfected   hand hygiene promoted   rest/sleep promoted   single patient room provided  Goal: Optimal Comfort and Wellbeing  Outcome: Progressing  Goal: Readiness for Transition of Care  Outcome: Progressing     "

## 2024-04-11 NOTE — UTILIZATION REVIEW
"[facility] Utilization Review  Admission Status; Secondary Review Determination     Admission Date: 4/9/2024  6:43 PM      Under the authority of the Utilization Management Committee, the utilization review process indicated a secondary review on the above patient.  The review outcome is based on review of the medical records, discussions with staff, and applying clinical experience noted on the date of the review.        (X) Observation Status Appropriate - This patient does not meet hospital inpatient criteria and is placed in observation status. If this patient's primary payer is Medicare and was admitted as an inpatient, Condition Code 44 should be used and patient status changed to \"observation\".   () Observation Status concurrent Review           RATIONALE FOR DETERMINATION   28-year-old male with history of heterotaxy syndrome with asplenia and congenital heart defect status post surgery, cannabis hyperemesis syndrome, depression, admitted with intractable nausea and vomiting.  Multiple hot showers overnight, which helped relieve symptoms, improvement in anion gap metabolic acidosis, continued on IV fluid hydration with potassium replacement, IV PPI and has tolerated sips of water after hot showers.  Plan for advancing diet to regular as tolerated, does not meet criteria for inpatient stay, recommend continue observation status      The severity of illness, intensity of service provided, expected LOS make the care appropriate for observation status at this time.        The information on this document is developed by the utilization review team in order for the business office to ensure compliance.  This only denotes the appropriateness of proper admission status and does not reflect the quality of care rendered.              Sincerely,       Keith Portillo MD  Physician Advisor  Utilization Review-[place]    Phone: [phone]     "

## 2024-04-11 NOTE — PLAN OF CARE
PRIMARY DIAGNOSIS: CYCLIC VOMITING     OUTPATIENT/OBSERVATION GOALS TO BE MET BEFORE DISCHARGE  1. Orthostatic performed: N/A    2. Tolerating PO fluid and/or antibiotics (if applicable):  Tolerating clear liquids, ADAT    3. Nausea/Vomiting symptoms improved: C/o on-going nausea, taking frequent showers, given IV Compazine x1. No emesis occurrence this AM.     4. Pain status: Improved with use of alternative comfort measures i.e.: positioning, showers     5. Return to near baseline physical activity: Yes    Discharge Planner Nurse   Safe discharge environment identified: Yes  Barriers to discharge: Yes       Entered by: Inez Suazo RN 04/11/2024     A&Ox4. Resting between cares. Up independently in room. Tolerating clear liquids, encouraged po intake. C/o on-going nausea, given IV Compazine and has been taking frequent showers. Patient states Zofran has not helped. IVF infusing. Plan to ADAT, PRN anti-emetics.     Please review provider order for any additional goals.   Nurse to notify provider when observation goals have been met and patient is ready for discharge.    Problem: Adult Inpatient Plan of Care  Goal: Plan of Care Review  Description: The Plan of Care Review/Shift note should be completed every shift.  The Outcome Evaluation is a brief statement about your assessment that the patient is improving, declining, or no change.  This information will be displayed automatically on your shift  note.  4/11/2024 1412 by Inez Suazo RN  Outcome: Progressing  Flowsheets (Taken 4/11/2024 1412)  Plan of Care Reviewed With: patient  Overall Patient Progress: no change  4/11/2024 1411 by Inez Suazo RN  Outcome: Progressing  Flowsheets (Taken 4/11/2024 1411)  Outcome Evaluation: Continues to take frequent showers, given IV Compazine x1. States Zofran does not help. Tolerating only clear liquids  Plan of Care Reviewed With: patient  Overall Patient Progress: no change  Goal: Patient-Specific Goal  "(Individualized)  Description: You can add care plan individualizations to a care plan. Examples of Individualization might be:  \"Parent requests to be called daily at 9am for status\", \"I have a hard time hearing out of my right ear\", or \"Do not touch me to wake me up as it startles  me\".  4/11/2024 1412 by Inez Suazo RN  Outcome: Progressing  4/11/2024 1411 by Inez Suazo RN  Outcome: Progressing  Goal: Absence of Hospital-Acquired Illness or Injury  4/11/2024 1412 by Inez Suazo RN  Outcome: Progressing  4/11/2024 1411 by Inez Suazo RN  Outcome: Progressing  Goal: Optimal Comfort and Wellbeing  4/11/2024 1412 by Inez Suazo RN  Outcome: Progressing  4/11/2024 1411 by Inez Suazo RN  Outcome: Progressing  Intervention: Monitor Pain and Promote Comfort  Recent Flowsheet Documentation  Taken 4/11/2024 1355 by Inez Suazo RN  Pain Management Interventions:   shower   rest   repositioned  Taken 4/11/2024 1313 by Inez Suazo RN  Pain Management Interventions:   shower   rest   repositioned  Taken 4/11/2024 1300 by Inez Suazo RN  Pain Management Interventions: shower  Taken 4/11/2024 1247 by Inez Suazo RN  Pain Management Interventions:   rest   repositioned  Taken 4/11/2024 0848 by Inez Suazo RN  Pain Management Interventions: shower  Taken 4/11/2024 0725 by Inez Suazo RN  Pain Management Interventions: shower  Goal: Readiness for Transition of Care  4/11/2024 1412 by Inez Suazo RN  Outcome: Progressing  4/11/2024 1411 by Inez Suazo RN  Outcome: Progressing     Problem: Nausea and Vomiting  Goal: Nausea and Vomiting Relief  4/11/2024 1412 by Inez Suazo RN  Outcome: Progressing  4/11/2024 1411 by Inez Suazo RN  Outcome: Progressing     Problem: Comorbidity Management  Goal: Maintenance of Behavioral Health Symptom Control  4/11/2024 1412 by Inez Suazo RN  Outcome: Progressing  4/11/2024 1411 " by Inez Suazo, RN  Outcome: Progressing       Goal Outcome Evaluation:      Plan of Care Reviewed With: patient    Overall Patient Progress: no changeOverall Patient Progress: no change    Outcome Evaluation: Continues to take frequent showers, given IV Compazine x1. States Zofran does not help. Tolerating only clear liquids

## 2024-04-11 NOTE — PROGRESS NOTES
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     Cyclical vomiting syndrome, likely cannabinoid hyperemesis syndrome   AGMA  Mild starvation ketoacidosis  Leukocytosis  Constipation: presented with persistent nausea and numerous episodes of emesis over the last 36 hours.  Last 4-5 emesis had darker appearance that could have been blood.  Has some mild epigastric pain.  No fevers, chills, or diarrhea.  He is constipated and reported no bowel movement in about 1 week.  He went to the urgency room in [place] the evening of 4/9 and had blood work there.  History of cyclical vomiting secondary to cannabis hyperemesis syndrome.  Last used marijuana roughly 2 weeks PTA. Initially tachycardic in the ER that resolved with 1 L NS.  AGMA with bicarb of 16 and anion gap of 20, otherwise BMP fairly unremarkable, likely secondary to GI loss from vomiting in addition to mild starvation ketoacidosis with ketone elevation at 1.0.  Bilirubin elevated 1.3 similar to previous levels, but transaminases and alk phos normal.  Lipase normal at 21. Initial leukocytosis of 16.2 which is down from 20 when checked on 4/9.  Suspect this is stress demargination secondary to vomiting and also hemoconcentration.  No diarrhea which would make acute gastroenteritis less likely.  Abdominal exam is benign so no imaging obtained at this time.  Admission for symptomatic care with advancing diet as tolerated, IV fluids, and antiemetics.  Patient was somnolent on admission following IM Zyprexa and IV Benadryl.  -nausea and vomiting mostly resolved but sleeping most of day, only tolerating small amounts of clears  -Multiple hot  showers since admission, which patient states is only thing that will relieve his symptoms  -Repeat abdominal exam benign and leukocytosis almost normalized at 11.2 on 4/11  -Ongoing AGMA but with continued improvement on repeat labs, continue to follow BMP  -Continue IVF hydration with NS +20 mEq K at 100 L/h, this has been stopped quite frequently since admission due to repeated need for showers  -potassium and magnesium replacement protocols   -IV protonix BID  -ADAT, more symptomatic the afternoon of 4/11, hopeful symptoms controlled by AM on 4/12 and patient able to stay hydrated on own and discharge home      Heterotaxy syndrome  History congenital heart defect s/p surgery  Congenital asplenia: 3 previous cardiac surgeries for congenital heart defect by age 2 in setting of heterotaxy syndrome.  He has had a hernia repair in the past as well.  -Resume PTA 81 mg aspirin and 250 mg amoxicillin daily that he is on chronically     Hyperglycemia: Glucose elevation has ranged from 120-180 on previous BMPs.  Added on A1c that is 6.0.  No history of diabetes.  -Should not need treatment or close monitoring here  -Follow-up with PCP     Observation Goals: -diagnostic tests and consults completed and resulted, -vital signs normal or at patient baseline, -tolerating oral intake to maintain hydration, Nurse to notify provider when observation goals have been met and patient is ready for discharge.  Diet: Advance Diet as Tolerated: Clear Liquid Diet; Regular Diet Adult    DVT Prophylaxis: Low Risk/Ambulatory with no VTE prophylaxis indicated  Jang Catheter: Not present  Lines: None     Cardiac Monitoring: None  Code Status: Full Code      Clinically Significant Risk Factors Present on Admission             # Anion Gap Metabolic Acidosis: Highest Anion Gap = 20 mmol/L in last 2 days, will monitor and treat as appropriate    # Drug Induced Platelet Defect: home medication list includes an antiplatelet medication             "       Disposition Plan     Medically Ready for Discharge: Anticipated Tomorrow     The patient's care was discussed with the Bedside Nurse and Patient.    Fallon Desai PA-C  Hospitalist Service  [facility]  Securely message with Sonya (more info)  Text page via [facility] Paging/Directory   ______________________________________________________________________    Interval History   Patient sleeping upon entering room. He feels his nausea is currently \"okay\" and he has vomited recently. He has tolerated some water and lemon ice. Denies abdominal pain.     Per RN patient has been in and out of hot showers all day long since this reportedly helps his symptoms.     Physical Exam   Vital Signs: Temp: 98.6  F (37  C) Temp src: Oral BP: 138/67 Pulse: 67   Resp: 14 SpO2: 93 % O2 Device: None (Room air)    Weight: 141 lbs 0 oz  General Appearance: Sleeping up on entering but will wake to answer questions, pleasant, NAD  Respiratory: CTAB without wheezing or rales  Cardiovascular: RRR without murmur or rub  GI: soft, nontender, nondistended, normoactive bowel sounds   Skin: warm, dry    Medical Decision Making       40 MINUTES SPENT BY ME on the date of service doing chart review, history, exam, documentation & further activities per the note.      Data   ------------------------- PAST 24 HR DATA REVIEWED -----------------------------------------------  "

## 2024-04-11 NOTE — PLAN OF CARE
Cyclical Vomiting  OUTPATIENT/OBSERVATION GOALS TO BE MET BEFORE DISCHARGE  1. Orthostatic performed: N/A     2. Tolerating PO medications: No     3. Return to near baseline physical activity: Yes     4. Cleared for discharge by consultants (if involved): No        Discharge Planner Nurse  Safe discharge environment identified: Yes  Barriers to discharge: Yes       Entered by: Remedios Lizama RN 04/10/2024      Pt is A&Ox4. VSS on RA. Clear liquid diet. Independent in room. NS+KCL 20mEq running @ 100 ml/hr. Taking showers to relieve nausea.  Taking in ice chips/ small sips of water.      Goal Outcome Evaluation:      Plan of Care Reviewed With: patient    Overall Patient Progress: no changeOverall Patient Progress: no change    Outcome Evaluation: Nausea continued. Hot showers x2. Fluids running @ 100 ml/hr.  Liquids as tolerated.

## 2024-04-11 NOTE — PLAN OF CARE
PRIMARY DIAGNOSIS: CYCLIC VOMITING     OUTPATIENT/OBSERVATION GOALS TO BE MET BEFORE DISCHARGE  1. Orthostatic performed: N/A    2. Tolerating PO fluid and/or antibiotics (if applicable): Tolerating clear liquids, ADAT. Has had ice chips, water, strawberry kiwi water and broth today, tolerated fairly, but states he is not ready for full liquids. Mother at bedside encouraging po intake.     3. Nausea/Vomiting symptoms improved: C/o on-going nausea, taking frequent showers, given IV Compazine x1, Ativan x1 (states Zofran does not help when offered). No emesis occurrence this AM.     4. Pain status: Improved with use of alternative comfort measures i.e.: positioning, showers     5. Return to near baseline physical activity: Yes    Discharge Planner Nurse   Safe discharge environment identified: Yes  Barriers to discharge: Yes       Entered by: Inez Suazo RN 04/11/2024     A&Ox4. Resting between cares. Up independently in room. Tolerating clear liquids, encouraged po intake. C/o on-going nausea, given IV Compazine, Ativan, and continues to take frequent showers. Patient states Zofran has not helped. IVF infusing. Plan to ADAT, PRN anti-emetics. Mother at bedside assisting w/ po intake.     Please review provider order for any additional goals.   Nurse to notify provider when observation goals have been met and patient is ready for discharge.    Problem: Adult Inpatient Plan of Care  Goal: Plan of Care Review  Description: The Plan of Care Review/Shift note should be completed every shift.  The Outcome Evaluation is a brief statement about your assessment that the patient is improving, declining, or no change.  This information will be displayed automatically on your shift  note.  4/11/2024 1412 by Inez Suazo RN  Outcome: Progressing  Flowsheets (Taken 4/11/2024 1412)  Plan of Care Reviewed With: patient  Overall Patient Progress: no change  4/11/2024 1411 by Inez Suazo RN  Outcome:  "Progressing  Flowsheets (Taken 4/11/2024 1411)  Outcome Evaluation: Continues to take frequent showers, given IV Compazine x1. States Zofran does not help. Tolerating only clear liquids  Plan of Care Reviewed With: patient  Overall Patient Progress: no change  Goal: Patient-Specific Goal (Individualized)  Description: You can add care plan individualizations to a care plan. Examples of Individualization might be:  \"Parent requests to be called daily at 9am for status\", \"I have a hard time hearing out of my right ear\", or \"Do not touch me to wake me up as it startles  me\".  4/11/2024 1412 by Inez Suazo RN  Outcome: Progressing  4/11/2024 1411 by Inez Suazo RN  Outcome: Progressing  Goal: Absence of Hospital-Acquired Illness or Injury  4/11/2024 1412 by Inez Suazo RN  Outcome: Progressing  4/11/2024 1411 by Inez Suazo RN  Outcome: Progressing  Goal: Optimal Comfort and Wellbeing  4/11/2024 1412 by Inez Suazo RN  Outcome: Progressing  4/11/2024 1411 by Inez Suazo RN  Outcome: Progressing  Intervention: Monitor Pain and Promote Comfort  Recent Flowsheet Documentation  Taken 4/11/2024 1355 by Inez Suazo RN  Pain Management Interventions:   shower   rest   repositioned  Taken 4/11/2024 1313 by Inez Suazo RN  Pain Management Interventions:   shower   rest   repositioned  Taken 4/11/2024 1300 by Inez Suazo RN  Pain Management Interventions: shower  Taken 4/11/2024 1247 by Inez Suazo RN  Pain Management Interventions:   rest   repositioned  Taken 4/11/2024 0848 by Inez Suazo RN  Pain Management Interventions: shower  Taken 4/11/2024 0725 by Inez Suazo RN  Pain Management Interventions: shower  Goal: Readiness for Transition of Care  4/11/2024 1412 by Inez Suazo RN  Outcome: Progressing  4/11/2024 1411 by Inez Suazo RN  Outcome: Progressing     Problem: Nausea and Vomiting  Goal: Nausea and Vomiting " "Relief  4/11/2024 1412 by Inez Suazo RN  Outcome: Progressing  4/11/2024 1411 by Inez Suazo RN  Outcome: Progressing     Problem: Comorbidity Management  Goal: Maintenance of Behavioral Health Symptom Control  4/11/2024 1412 by Inez Suazo RN  Outcome: Progressing  4/11/2024 1411 by Inez Suazo RN  Outcome: Progressing       Goal Outcome Evaluation:      Plan of Care Reviewed With: patient    Overall Patient Progress: no changeOverall Patient Progress: no change    Outcome Evaluation: Continues to take frequent showers, given IV Compazine x1. States Zofran does not help. Tolerating only clear liquids    Problem: Adult Inpatient Plan of Care  Goal: Plan of Care Review  Description: The Plan of Care Review/Shift note should be completed every shift.  The Outcome Evaluation is a brief statement about your assessment that the patient is improving, declining, or no change.  This information will be displayed automatically on your shift  note.  4/11/2024 1556 by Inez Suazo RN  Outcome: Not Progressing  Flowsheets (Taken 4/11/2024 1556)  Plan of Care Reviewed With:   patient   parent  Overall Patient Progress: no change  4/11/2024 1412 by Inez Suazo RN  Outcome: Progressing  Flowsheets (Taken 4/11/2024 1412)  Plan of Care Reviewed With: patient  Overall Patient Progress: no change  4/11/2024 1411 by Inez Suazo RN  Outcome: Progressing  Flowsheets (Taken 4/11/2024 1411)  Outcome Evaluation: Continues to take frequent showers, given IV Compazine x1. States Zofran does not help. Tolerating only clear liquids  Plan of Care Reviewed With: patient  Overall Patient Progress: no change  Goal: Patient-Specific Goal (Individualized)  Description: You can add care plan individualizations to a care plan. Examples of Individualization might be:  \"Parent requests to be called daily at 9am for status\", \"I have a hard time hearing out of my right ear\", or \"Do not touch me to wake me up " "as it startles  me\".  4/11/2024 1556 by Inez Suazo RN  Outcome: Not Progressing  4/11/2024 1412 by Inez Suazo RN  Outcome: Progressing  4/11/2024 1411 by nIez Suazo RN  Outcome: Progressing  Goal: Absence of Hospital-Acquired Illness or Injury  4/11/2024 1556 by Inez Suazo RN  Outcome: Not Progressing  4/11/2024 1412 by Inez Suazo RN  Outcome: Progressing  4/11/2024 1411 by Inez Suazo RN  Outcome: Progressing  Intervention: Prevent Skin Injury  Recent Flowsheet Documentation  Taken 4/11/2024 1355 by Inez Suazo RN  Body Position: position changed independently  Goal: Optimal Comfort and Wellbeing  4/11/2024 1556 by Inez Suazo RN  Outcome: Not Progressing  4/11/2024 1412 by Inez Suazo RN  Outcome: Progressing  4/11/2024 1411 by Inez Suazo RN  Outcome: Progressing  Intervention: Monitor Pain and Promote Comfort  Recent Flowsheet Documentation  Taken 4/11/2024 1355 by Inez Suazo RN  Pain Management Interventions:   shower   rest   repositioned  Taken 4/11/2024 1313 by Inez Suazo RN  Pain Management Interventions:   shower   rest   repositioned  Taken 4/11/2024 1300 by Inez Suazo RN  Pain Management Interventions: shower  Taken 4/11/2024 1247 by Inez Suazo RN  Pain Management Interventions:   rest   repositioned  Taken 4/11/2024 0848 by Inez Suazo RN  Pain Management Interventions: shower  Taken 4/11/2024 0725 by Inez Suazo RN  Pain Management Interventions: shower  Goal: Readiness for Transition of Care  4/11/2024 1556 by Inez Suazo RN  Outcome: Not Progressing  4/11/2024 1412 by Inez Suazo RN  Outcome: Progressing  4/11/2024 1411 by Inez Suazo RN  Outcome: Progressing     Problem: Nausea and Vomiting  Goal: Nausea and Vomiting Relief  4/11/2024 1556 by Inez Suazo RN  Outcome: Not Progressing  4/11/2024 1412 by Inez Suazo, RN  Outcome: " Progressing  4/11/2024 1411 by Inez Suazo, RN  Outcome: Progressing     Problem: Comorbidity Management  Goal: Maintenance of Behavioral Health Symptom Control  4/11/2024 1556 by Inez Suazo, RN  Outcome: Not Progressing  4/11/2024 1412 by Inez Suazo, RN  Outcome: Progressing  4/11/2024 1411 by Inez Suazo, RN  Outcome: Progressing     Goal Outcome Evaluation:      Plan of Care Reviewed With: patient, parent    Overall Patient Progress: no changeOverall Patient Progress: no change    Outcome Evaluation: Continues to take frequent showers, given IV Compazine x1. States Zofran does not help. Tolerating only clear liquids

## 2024-04-11 NOTE — PLAN OF CARE
"PRIMARY DIAGNOSIS: CANNABINOID HYPEREMESIS SYNDROME     OUTPATIENT/OBSERVATION GOALS TO BE MET BEFORE DISCHARGE    1. Orthostatic performed: N/A     2. Tolerating PO fluid and/or antibiotics (if applicable): No     3. Nausea/Vomiting/Diarrhea symptoms improved: No, continues to report continuous nausea.     4. Pain status: Denies currently.     5. Return to near baseline physical activity: Yes     Discharge Planner Nurse  Safe discharge environment identified: Yes  Barriers to discharge: Yes - continued nausea, not tolerating clear liquids.    /59 (BP Location: Left arm)   Pulse 87   Temp 98.2  F (36.8  C) (Oral)   Resp 20   Ht 1.753 m (5' 9\")   Wt 64 kg (141 lb)   SpO2 97%   BMI 20.82 kg/m      Denies abdominal pain. Patient reports near continuous nausea; states 'I'm in a cycle today - I'll be awake for 20 minutes and then I need to shower, then I'll sleep, and then repeat'. Nausea does not appear to improve with antiemetics. Took at least 5 showers this shift. Was able to take PO PTA meds with water. Took sips of water, bites of lemon ice. IVF continued. Up ind. Mom bedside. [employer] protocol call for recheck in AM.      Please review provider order for any additional goals.   Nurse to notify provider when observation goals have been met and patient is ready for discharge.    Goal Outcome Evaluation:    Plan of Care Reviewed With: patient, parent    Overall Patient Progress: no change    Outcome Evaluation: Nausea continued, only relieved by hot shower.      Problem: Adult Inpatient Plan of Care  Goal: Plan of Care Review  Description: The Plan of Care Review/Shift note should be completed every shift.  The Outcome Evaluation is a brief statement about your assessment that the patient is improving, declining, or no change.  This information will be displayed automatically on your shift  note.  4/10/2024 1927 by Berenice Rayo RN  Outcome: Not Progressing  Flowsheets (Taken 4/10/2024 " "1927)  Outcome Evaluation: Nausea continued, only relieved by hot shower.  Plan of Care Reviewed With:   patient   parent  Overall Patient Progress: no change  4/10/2024 1248 by Berenice Rayo RN  Outcome: Not Progressing  Flowsheets (Taken 4/10/2024 1248)  Outcome Evaluation: Continues to report nausea, compazine given. Patient requesting frequent hot showers.  Plan of Care Reviewed With:   patient   parent  Overall Patient Progress: no change  Goal: Patient-Specific Goal (Individualized)  Description: You can add care plan individualizations to a care plan. Examples of Individualization might be:  \"Parent requests to be called daily at 9am for status\", \"I have a hard time hearing out of my right ear\", or \"Do not touch me to wake me up as it startles  me\".  4/10/2024 1927 by Berenice Rayo RN  Outcome: Not Progressing  4/10/2024 1248 by Berenice Rayo RN  Outcome: Not Progressing  Goal: Absence of Hospital-Acquired Illness or Injury  4/10/2024 1927 by Berenice Rayo RN  Outcome: Not Progressing  4/10/2024 1248 by Berenice Rayo RN  Outcome: Not Progressing  Intervention: Identify and Manage Fall Risk  Recent Flowsheet Documentation  Taken 4/10/2024 0840 by Berenice Rayo RN  Safety Promotion/Fall Prevention:   clutter free environment maintained   increased rounding and observation   nonskid shoes/slippers when out of bed   patient and family education   room near nurse's station   room organization consistent   safety round/check completed  Intervention: Prevent Skin Injury  Recent Flowsheet Documentation  Taken 4/10/2024 1000 by Berenice Rayo RN  Body Position: position changed independently  Taken 4/10/2024 0840 by Berenice Rayo RN  Body Position: position changed independently  Skin Protection:   adhesive use limited   incontinence pads utilized  Device Skin Pressure Protection: absorbent pad utilized/changed  Intervention: Prevent " Infection  Recent Flowsheet Documentation  Taken 4/10/2024 1501 by Berenice Rayo RN  Infection Prevention:   hand hygiene promoted   rest/sleep promoted  Taken 4/10/2024 0840 by Berenice Ryao RN  Infection Prevention:   hand hygiene promoted   rest/sleep promoted  Goal: Optimal Comfort and Wellbeing  4/10/2024 1927 by Berenice Rayo RN  Outcome: Not Progressing  4/10/2024 1248 by Berenice Rayo RN  Outcome: Not Progressing  Goal: Readiness for Transition of Care  4/10/2024 1927 by Berenice Rayo RN  Outcome: Not Progressing  4/10/2024 1248 by Berenice Rayo RN  Outcome: Not Progressing     Problem: Nausea and Vomiting  Goal: Nausea and Vomiting Relief  4/10/2024 1927 by Berenice Rayo RN  Outcome: Not Progressing  4/10/2024 1248 by Berenice Rayo RN  Outcome: Not Progressing  Intervention: Prevent and Manage Nausea and Vomiting  Recent Flowsheet Documentation  Taken 4/10/2024 0840 by Berenice Rayo RN  Nausea/Vomiting Interventions: (shower)   antiemetic   other (see comments)     Problem: Comorbidity Management  Goal: Maintenance of Behavioral Health Symptom Control  4/10/2024 1927 by Berenice Rayo RN  Outcome: Not Progressing  4/10/2024 1248 by Berenice Rayo RN  Outcome: Not Progressing  Intervention: Maintain Behavioral Health Symptom Control  Recent Flowsheet Documentation  Taken 4/10/2024 0840 by Berenice Rayo RN  Medication Review/Management: medications reviewed

## 2024-04-11 NOTE — PLAN OF CARE
PRIMARY DIAGNOSIS: CYCLIC VOMITING     OUTPATIENT/OBSERVATION GOALS TO BE MET BEFORE DISCHARGE  1. Orthostatic performed: N/A    2. Tolerating PO fluid and/or antibiotics (if applicable): Tolerating clear liquids, ADAT. Has had ice chips, water, strawberry kiwi water and broth today, tolerated fairly, but states he is not ready for full liquids. Mother at bedside encouraging po intake.     3. Nausea/Vomiting symptoms improved: C/o on-going nausea, taking frequent showers, given IV Compazine x1, Ativan x1 (states Zofran does not help when offered). No emesis occurrence this AM.     4. Pain status: Improved with use of alternative comfort measures i.e.: positioning, showers     5. Return to near baseline physical activity: Yes    Discharge Planner Nurse   Safe discharge environment identified: Yes  Barriers to discharge: Yes       Entered by: Inez Suazo RN 04/11/2024     A&Ox4. Resting between cares. Up independently in room. Tolerating clear liquids, encouraged po intake. C/o on-going nausea, given IV Compazine, Ativan, and continues to take frequent showers. Patient states Zofran has not helped. IVF infusing. Plan to ADAT, PRN anti-emetics. Mother at bedside assisting w/ po intake.     Please review provider order for any additional goals.   Nurse to notify provider when observation goals have been met and patient is ready for discharge.      Problem: Adult Inpatient Plan of Care  Goal: Plan of Care Review  Description: The Plan of Care Review/Shift note should be completed every shift.  The Outcome Evaluation is a brief statement about your assessment that the patient is improving, declining, or no change.  This information will be displayed automatically on your shift  note.  4/11/2024 1603 by Ienz Suazo RN  Outcome: Not Progressing  Flowsheets (Taken 4/11/2024 1603)  Outcome Evaluation: Tolerating clear liquids. Nausea managed w/ IV Compazine x1, IV Ativan, hot showers. Encouraged po intake,  "patient state he does not feel ready for full liquids yet.  Plan of Care Reviewed With:   patient   parent  Overall Patient Progress: no change  4/11/2024 1556 by Inez Suazo RN  Outcome: Not Progressing  Flowsheets (Taken 4/11/2024 1556)  Plan of Care Reviewed With:   patient   parent  Overall Patient Progress: no change  4/11/2024 1412 by Inez Suazo RN  Outcome: Progressing  Flowsheets (Taken 4/11/2024 1412)  Plan of Care Reviewed With: patient  Overall Patient Progress: no change  4/11/2024 1411 by Inez Suazo RN  Outcome: Progressing  Flowsheets (Taken 4/11/2024 1411)  Outcome Evaluation: Continues to take frequent showers, given IV Compazine x1. States Zofran does not help. Tolerating only clear liquids  Plan of Care Reviewed With: patient  Overall Patient Progress: no change  Goal: Patient-Specific Goal (Individualized)  Description: You can add care plan individualizations to a care plan. Examples of Individualization might be:  \"Parent requests to be called daily at 9am for status\", \"I have a hard time hearing out of my right ear\", or \"Do not touch me to wake me up as it startles  me\".  4/11/2024 1603 by Inez Suazo RN  Outcome: Not Progressing  4/11/2024 1556 by Inez Suazo RN  Outcome: Not Progressing  4/11/2024 1412 by Inez Suazo RN  Outcome: Progressing  4/11/2024 1411 by Inez Suazo RN  Outcome: Progressing  Goal: Absence of Hospital-Acquired Illness or Injury  4/11/2024 1603 by Inez Suazo RN  Outcome: Not Progressing  4/11/2024 1556 by Inez Suazo RN  Outcome: Not Progressing  4/11/2024 1412 by Inez Suazo RN  Outcome: Progressing  4/11/2024 1411 by Inez Suazo RN  Outcome: Progressing  Intervention: Prevent Skin Injury  Recent Flowsheet Documentation  Taken 4/11/2024 1355 by Inez Suazo RN  Body Position: position changed independently  Taken 4/11/2024 0848 by Inez Suazo RN  Body Position: position changed " independently  Skin Protection: adhesive use limited  Device Skin Pressure Protection:   absorbent pad utilized/changed   tubing/devices free from skin contact  Intervention: Prevent and Manage VTE (Venous Thromboembolism) Risk  Recent Flowsheet Documentation  Taken 4/11/2024 0848 by Inez Suazo RN  VTE Prevention/Management: SCDs (sequential compression devices) off  Intervention: Prevent Infection  Recent Flowsheet Documentation  Taken 4/11/2024 0848 by Inez Suazo RN  Infection Prevention:   rest/sleep promoted   single patient room provided   hand hygiene promoted  Goal: Optimal Comfort and Wellbeing  4/11/2024 1603 by Inez Suazo RN  Outcome: Not Progressing  4/11/2024 1556 by Inez Suazo RN  Outcome: Not Progressing  4/11/2024 1412 by Inez Suazo RN  Outcome: Progressing  4/11/2024 1411 by Inez Suazo RN  Outcome: Progressing  Intervention: Monitor Pain and Promote Comfort  Recent Flowsheet Documentation  Taken 4/11/2024 1355 by Inez Suazo RN  Pain Management Interventions:   shower   rest   repositioned  Taken 4/11/2024 1313 by Inez Suazo RN  Pain Management Interventions:   shower   rest   repositioned  Taken 4/11/2024 1300 by Inez Suazo RN  Pain Management Interventions: shower  Taken 4/11/2024 1247 by Inze Suazo RN  Pain Management Interventions:   rest   repositioned  Taken 4/11/2024 0848 by Inez Suazo RN  Pain Management Interventions: shower  Taken 4/11/2024 0725 by Inez Suazo RN  Pain Management Interventions: shower  Goal: Readiness for Transition of Care  4/11/2024 1603 by Inez Suazo RN  Outcome: Not Progressing  4/11/2024 1556 by Inez Suazo RN  Outcome: Not Progressing  4/11/2024 1412 by Inez Suazo RN  Outcome: Progressing  4/11/2024 1411 by Inez Suazo RN  Outcome: Progressing     Problem: Nausea and Vomiting  Goal: Nausea and Vomiting Relief  4/11/2024 1603 by Inez Suazo  RN  Outcome: Not Progressing  4/11/2024 1556 by Inez Suazo RN  Outcome: Not Progressing  4/11/2024 1412 by Inez Suazo RN  Outcome: Progressing  4/11/2024 1411 by Inez Suazo RN  Outcome: Progressing  Intervention: Prevent and Manage Nausea and Vomiting  Recent Flowsheet Documentation  Taken 4/11/2024 0848 by Inez Suazo RN  Fluid/Electrolyte Management: fluids provided  Nausea/Vomiting Interventions:   antiemetic   nausea triggers minimized   cool cloth applied   slow deep breathing encouraged   sips of clear liquids given   stimuli minimized  Oral Care: oral rinse provided  Environmental Support:   calm environment promoted   rest periods encouraged   distractions minimized   environmental consistency promoted   personal routine supported     Problem: Comorbidity Management  Goal: Maintenance of Behavioral Health Symptom Control  4/11/2024 1603 by Inez Suazo RN  Outcome: Not Progressing  4/11/2024 1556 by Inez Suazo RN  Outcome: Not Progressing  4/11/2024 1412 by Inez Suazo RN  Outcome: Progressing  4/11/2024 1411 by Inez Suazo RN  Outcome: Progressing  Intervention: Maintain Behavioral Health Symptom Control  Recent Flowsheet Documentation  Taken 4/11/2024 0848 by Inez Suazo RN  Medication Review/Management:   medications reviewed   high-risk medications identified    Goal Outcome Evaluation:    Plan of Care Reviewed With: patient, parent    Overall Patient Progress: no changeOverall Patient Progress: no change    Outcome Evaluation: Tolerating clear liquids. Nausea managed w/ IV Compazine x1, IV Ativan, hot showers. Encouraged po intake, patient state he does not feel ready for full liquids yet.

## 2024-04-11 NOTE — PLAN OF CARE
"Cyclical Vomiting  OUTPATIENT/OBSERVATION GOALS TO BE MET BEFORE DISCHARGE  1. Orthostatic performed: N/A     2. Tolerating PO medications: No     3. Return to near baseline physical activity: Yes     4. Cleared for discharge by consultants (if involved): No        Discharge Planner Nurse  Safe discharge environment identified: Yes  Barriers to discharge: Yes       Entered by: Remedios Lizama RN 04/10/2024      Pt is A&Ox4. VSS on RA. Clear liquid diet. Independent in room. NS+KCL 20mEq running @ 100 ml/hr. Taking showers to relieve nausea.  Taking in ice chips/ small sips of water.    Problem: Adult Inpatient Plan of Care  Goal: Plan of Care Review  Description: The Plan of Care Review/Shift note should be completed every shift.  The Outcome Evaluation is a brief statement about your assessment that the patient is improving, declining, or no change.  This information will be displayed automatically on your shift  note.  4/11/2024 0355 by Remedios Lizama RN  Outcome: Progressing  4/11/2024 0349 by Remedios Lizama RN  Outcome: Progressing  Goal: Patient-Specific Goal (Individualized)  Description: You can add care plan individualizations to a care plan. Examples of Individualization might be:  \"Parent requests to be called daily at 9am for status\", \"I have a hard time hearing out of my right ear\", or \"Do not touch me to wake me up as it startles  me\".  4/11/2024 0355 by Remedios Lizama RN  Outcome: Progressing  4/11/2024 0349 by Remedios Lizama RN  Outcome: Progressing  Goal: Absence of Hospital-Acquired Illness or Injury  4/11/2024 0355 by Remedios Lizama RN  Outcome: Progressing  4/11/2024 0349 by Remedios Lizama RN  Outcome: Progressing  Intervention: Prevent Skin Injury  Recent Flowsheet Documentation  Taken 4/10/2024 2200 by Remedios Lizama RN  Body Position: position changed independently  Intervention: Prevent Infection  Recent Flowsheet Documentation  Taken 4/10/2024 2200 by Remedios Lizama RN  Infection Prevention:   cohorting " utilized   equipment surfaces disinfected   hand hygiene promoted   rest/sleep promoted   single patient room provided  Goal: Optimal Comfort and Wellbeing  4/11/2024 0355 by Remedios Lizama, RN  Outcome: Progressing  4/11/2024 0349 by Remedios Lizama, RN  Outcome: Progressing  Goal: Readiness for Transition of Care  4/11/2024 0355 by Remedios Lizama, RN  Outcome: Progressing  4/11/2024 0349 by Remedios Lizama, RN  Outcome: Progressing   Goal Outcome Evaluation:      Plan of Care Reviewed With: patient    Overall Patient Progress: improvingOverall Patient Progress: improving    Outcome Evaluation: Pt nausea, up to shower x2.

## 2024-04-12 VITALS
BODY MASS INDEX: 20.88 KG/M2 | SYSTOLIC BLOOD PRESSURE: 162 MMHG | OXYGEN SATURATION: 93 % | HEIGHT: 69 IN | DIASTOLIC BLOOD PRESSURE: 107 MMHG | HEART RATE: 77 BPM | WEIGHT: 141 LBS | RESPIRATION RATE: 14 BRPM | TEMPERATURE: 98.3 F

## 2024-04-12 LAB
ANION GAP SERPL CALCULATED.3IONS-SCNC: 17 MMOL/L (ref 7–15)
BUN SERPL-MCNC: 10.5 MG/DL (ref 6–20)
CALCIUM SERPL-MCNC: 9.2 MG/DL (ref 8.6–10)
CHLORIDE SERPL-SCNC: 100 MMOL/L (ref 98–107)
CREAT SERPL-MCNC: 0.79 MG/DL (ref 0.67–1.17)
DEPRECATED HCO3 PLAS-SCNC: 20 MMOL/L (ref 22–29)
EGFRCR SERPLBLD CKD-EPI 2021: >90 ML/MIN/1.73M2
GLUCOSE SERPL-MCNC: 133 MG/DL (ref 70–99)
MAGNESIUM SERPL-MCNC: 2 MG/DL (ref 1.7–2.3)
POTASSIUM SERPL-SCNC: 4 MMOL/L (ref 3.4–5.3)
SODIUM SERPL-SCNC: 137 MMOL/L (ref 135–145)

## 2024-04-12 PROCEDURE — 80048 BASIC METABOLIC PNL TOTAL CA: CPT | Performed by: PHYSICIAN ASSISTANT

## 2024-04-12 PROCEDURE — 83735 ASSAY OF MAGNESIUM: CPT | Performed by: PHYSICIAN ASSISTANT

## 2024-04-12 PROCEDURE — 36415 COLL VENOUS BLD VENIPUNCTURE: CPT | Performed by: PHYSICIAN ASSISTANT

## 2024-04-12 PROCEDURE — 250N000011 HC RX IP 250 OP 636: Performed by: INTERNAL MEDICINE

## 2024-04-12 PROCEDURE — 99239 HOSP IP/OBS DSCHRG MGMT >30: CPT | Mod: FS | Performed by: PHYSICIAN ASSISTANT

## 2024-04-12 PROCEDURE — 250N000013 HC RX MED GY IP 250 OP 250 PS 637: Performed by: INTERNAL MEDICINE

## 2024-04-12 PROCEDURE — 96376 TX/PRO/DX INJ SAME DRUG ADON: CPT

## 2024-04-12 PROCEDURE — 96361 HYDRATE IV INFUSION ADD-ON: CPT

## 2024-04-12 PROCEDURE — C9113 INJ PANTOPRAZOLE SODIUM, VIA: HCPCS | Performed by: INTERNAL MEDICINE

## 2024-04-12 PROCEDURE — G0378 HOSPITAL OBSERVATION PER HR: HCPCS

## 2024-04-12 RX ORDER — PROCHLORPERAZINE MALEATE 10 MG
10 TABLET ORAL EVERY 6 HOURS PRN
Qty: 16 TABLET | Refills: 0 | Status: SHIPPED | OUTPATIENT
Start: 2024-04-12

## 2024-04-12 RX ADMIN — AMOXICILLIN 250 MG: 250 CAPSULE ORAL at 08:03

## 2024-04-12 RX ADMIN — PROCHLORPERAZINE MALEATE 10 MG: 5 TABLET ORAL at 11:50

## 2024-04-12 RX ADMIN — PANTOPRAZOLE SODIUM 40 MG: 40 INJECTION, POWDER, FOR SOLUTION INTRAVENOUS at 08:03

## 2024-04-12 RX ADMIN — POTASSIUM CHLORIDE AND SODIUM CHLORIDE: 900; 150 INJECTION, SOLUTION INTRAVENOUS at 13:18

## 2024-04-12 RX ADMIN — ASPIRIN 81 MG: 81 TABLET, COATED ORAL at 08:03

## 2024-04-12 ASSESSMENT — ACTIVITIES OF DAILY LIVING (ADL)
ADLS_ACUITY_SCORE: 21

## 2024-04-12 NOTE — PLAN OF CARE
"PRIMARY DIAGNOSIS: Nausea  OUTPATIENT/OBSERVATION GOALS TO BE MET BEFORE DISCHARGE:  ADLs back to baseline: Yes    Activity and level of assistance: Ambulating independently.    Pain status: Pain free.    Return to near baseline physical activity: Yes     Discharge Planner Nurse   Safe discharge environment identified: Yes  Barriers to discharge: No       Entered by: Julisa Kapadia RN 04/12/2024 1:36 PM   Pt utilizing hot showers, PO Compazine, aroma therapy, ambulation, sips of fluids. Nausea intermittent.  Please review provider order for any additional goals.   Nurse to notify provider when observation goals have been met and patient is ready for discharge.    Goal Outcome Evaluation:      Plan of Care Reviewed With: patient    Overall Patient Progress: improvingOverall Patient Progress: improving    Outcome Evaluation: Nausea intermittent. Attempting non-pharmaceutical interventions.      Problem: Adult Inpatient Plan of Care  Goal: Plan of Care Review  Description: The Plan of Care Review/Shift note should be completed every shift.  The Outcome Evaluation is a brief statement about your assessment that the patient is improving, declining, or no change.  This information will be displayed automatically on your shift  note.  4/12/2024 1334 by Julisa Kapadia RN  Outcome: Progressing  Flowsheets (Taken 4/12/2024 1334)  Outcome Evaluation: Nausea intermittent. Attempting non-pharmaceutical interventions.  Plan of Care Reviewed With: patient  Overall Patient Progress: improving  4/12/2024 1131 by Julisa Kapadia RN  Outcome: Progressing  Flowsheets (Taken 4/12/2024 1131)  Outcome Evaluation: Nausea intermittent. No PRNs over night.  Plan of Care Reviewed With: patient  Overall Patient Progress: improving  Goal: Patient-Specific Goal (Individualized)  Description: You can add care plan individualizations to a care plan. Examples of Individualization might be:  \"Parent requests to be called daily at 9am for " "status\", \"I have a hard time hearing out of my right ear\", or \"Do not touch me to wake me up as it startles  me\".  4/12/2024 1334 by Julisa Kapadia RN  Outcome: Progressing  4/12/2024 1131 by Julisa Kapadia RN  Outcome: Progressing  Goal: Absence of Hospital-Acquired Illness or Injury  4/12/2024 1334 by Julisa Kapadia RN  Outcome: Progressing  4/12/2024 1131 by Julisa Kapadia RN  Outcome: Progressing  Intervention: Identify and Manage Fall Risk  Recent Flowsheet Documentation  Taken 4/12/2024 0900 by Julisa Kapadia RN  Safety Promotion/Fall Prevention:   treat underlying cause   treat reversible contributory factors   safety round/check completed   room organization consistent   patient and family education   nonskid shoes/slippers when out of bed   clutter free environment maintained  Intervention: Prevent Skin Injury  Recent Flowsheet Documentation  Taken 4/12/2024 0900 by Julisa Kapadia RN  Body Position: position changed independently  Intervention: Prevent and Manage VTE (Venous Thromboembolism) Risk  Recent Flowsheet Documentation  Taken 4/12/2024 0900 by Julisa Kapadia RN  VTE Prevention/Management: (Pt ambulating) SCDs (sequential compression devices) off  Intervention: Prevent Infection  Recent Flowsheet Documentation  Taken 4/12/2024 0900 by Julisa Kapadia RN  Infection Prevention: rest/sleep promoted  Goal: Optimal Comfort and Wellbeing  4/12/2024 1334 by Julisa Kapadia RN  Outcome: Progressing  4/12/2024 1131 by Julisa Kapadia RN  Outcome: Progressing  Goal: Readiness for Transition of Care  4/12/2024 1334 by Julisa Kapadia RN  Outcome: Progressing  4/12/2024 1131 by Julisa Kapadia RN  Outcome: Progressing     Problem: Nausea and Vomiting  Goal: Nausea and Vomiting Relief  4/12/2024 1334 by Julisa Kapadia RN  Outcome: Progressing  4/12/2024 1131 by Julisa Kapadia RN  Outcome: Progressing  Intervention: Prevent and Manage Nausea and Vomiting  Recent Flowsheet " Documentation  Taken 4/12/2024 0900 by Julisa Kapadia, RN  Fluid/Electrolyte Management: fluids provided  Nausea/Vomiting Interventions: (encouraged to ambulate, shower)   stimuli minimized   slow deep breathing encouraged   sips of clear liquids given     Problem: Comorbidity Management  Goal: Maintenance of Behavioral Health Symptom Control  4/12/2024 1334 by Julisa Kapadia RN  Outcome: Progressing  4/12/2024 1131 by Julisa Kapadia RN  Outcome: Progressing  Intervention: Maintain Behavioral Health Symptom Control  Recent Flowsheet Documentation  Taken 4/12/2024 0900 by Julisa Kapadia, RN  Medication Review/Management: medications reviewed

## 2024-04-12 NOTE — PLAN OF CARE
"  Problem: Adult Inpatient Plan of Care  Goal: Plan of Care Review    Patient is Alert and Oriented x4. independent with no assistive devices . Pt is a Clear diet unable to tolerate advanced diet. denying pain.  Patient has Lactated Ringer's running at 100 mL per hour.  Outcome: Progressing  Goal: Patient-Specific Goal (Individualized)  Description: You can add care plan individualizations to a care plan. Examples of Individualization might be:  \"Parent requests to be called daily at 9am for status\", \"I have a hard time hearing out of my right ear\", or \"Do not touch me to wake me up as it startles  me\".  Outcome: Progressing  Goal: Absence of Hospital-Acquired Illness or Injury  Outcome: Progressing  Intervention: Prevent Skin Injury  Recent Flowsheet Documentation  Taken 4/11/2024 2010 by Adalgisa Escobedo RN  Body Position: position changed independently  Goal: Optimal Comfort and Wellbeing  Outcome: Progressing  Goal: Readiness for Transition of Care  Outcome: Progressing     Problem: Nausea and Vomiting  Goal: Nausea and Vomiting Relief  Outcome: Progressing     Problem: Comorbidity Management  Goal: Maintenance of Behavioral Health Symptom Control  Outcome: Progressing   Goal Outcome Evaluation:                        "

## 2024-04-12 NOTE — DISCHARGE SUMMARY
[facility]    Discharge Summary  Hospitalist    Date of Admission:  4/9/2024  Date of Discharge:  4/12/2024  Provider:  Justine Desai PA-C  Date of Service (when I last saw the patient): 04/12/24    Discharge Diagnoses   Cyclical vomiting syndrome, likely cannabinoid hyperemesis syndrome   AGMA  Mild starvation ketoacidosis   Leukocytosis  Constipation   Hyperglycemia     Other medical issues:  Past Medical History:   Diagnosis Date    Asplenia     Cannabis hyperemesis syndrome concurrent with and due to cannabis abuse (H)     Congenital heart anomaly     Heterotaxy syndrome      History of Present Illness   Bassam Zuñiga is a 28 year old male with PMH including 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  He received IM olanzapine and IV diphenhydramine by the time I saw him and was sleeping deeply so I was able to obtain some history from his father and the rest is from Dr. Zavala in the chart.  He has had nausea and numerous episodes of vomiting over the last 36 hours.  The most recent emesis has been darker in color and could be blood.  He has some mild to moderate epigastric pain.  No fevers, chills, or diarrhea.  He has not had a bowel movement in a week.  He was previously seen at the urgency room in [place] where he got IV antibiotics and blood work done.  He returns due to the darker colored emesis and persistent vomiting.  Symptoms similar to previous episodes of cannabis hyperemesis.  He last used marijuana roughly 2 weeks ago.  He does take aspirin daily.     History obtained from patient's father, medical record, and from Dr. Zavala in the emergency department.  Blood pressure initially 173/69 then 105/62, tachycardic to 105 that improved to 70 after 1 L NS, temperature 98.8  F.  Initial labs showed leukocytosis of 16.2, hemoglobin 17.0, platelet count 174.  Bilirubin slightly  elevated 1.7 otherwise LFTs normal.  Lipase normal at 21.  Glucose was 126.  Bicarb is low at 16 with anion gap of 20 otherwise BMP unremarkable.  He received 40 mg IV Protonix, 10 mg IV Compazine, 1 L NS, 10 mg IM olanzapine, and 25 mg IV diphenhydramine.  Vomiting has stopped, but he still cannot tolerate any p.o.  Admit to observation till symptoms improved. Please see the admission history and physical for full details.    Hospital Course   Bassam Zuñiga was admitted on 4/9/2024.  The following problems were addressed during his hospitalization:    Cyclical vomiting syndrome, likely cannabinoid hyperemesis syndrome   AGMA  Mild starvation ketoacidosis  Leukocytosis  Constipation: presented with persistent nausea and numerous episodes of emesis over the last 36 hours.  Last 4-5 emesis had darker appearance that could have been blood.  Has some mild epigastric pain.  No fevers, chills, or diarrhea.  He is constipated and reported no bowel movement in about 1 week.  He went to the urgency room in [place] the evening of 4/9 and had blood work there.  History of cyclical vomiting secondary to cannabis hyperemesis syndrome.  Last used marijuana roughly 2 weeks PTA. Initially tachycardic in the ER that resolved with 1 L NS.  AGMA with bicarb of 16 and anion gap of 20, otherwise BMP fairly unremarkable, likely secondary to GI loss from vomiting in addition to mild starvation ketoacidosis with ketone elevation at 1.0.  Bilirubin elevated 1.3 similar to previous levels, but transaminases and alk phos normal.  Lipase normal at 21. Initial leukocytosis of 16.2 which is down from 20 when checked on 4/9.  Suspect this is stress demargination secondary to vomiting and also hemoconcentration.  No diarrhea which would make acute gastroenteritis less likely.  Abdominal exam is benign so no imaging obtained at this time.  Admission for symptomatic care with advancing diet as tolerated, IV fluids, and antiemetics.  Patient was  somnolent on admission following IM Zyprexa and IV Benadryl.  -intermittent nausea without vomiting since 4/11, tolerating adequate fluid intake prior to discharge   -Multiple hot showers since admission, which patient states is only thing that will relieve his symptoms  -Repeat abdominal exam benign and leukocytosis almost normalized at 11.2 on 4/11  -Ongoing AGMA but with continued improvement on repeat labs, could consider rechecking BMP to confirm normalization   -received IVFs with NS + KCl during stay, frequently needed to be stopped due to showering   -electrolytes stable prior to discharge   -discussed importance of prolonged marijuana cessation to prevent recurrent symptoms  -provided prescription for Compazine for nausea at discharge      Heterotaxy syndrome  History congenital heart defect s/p surgery  Congenital asplenia: 3 previous cardiac surgeries for congenital heart defect by age 2 in setting of heterotaxy syndrome.  He has had a hernia repair in the past as well.  -continue PTA 81 mg aspirin and 250 mg amoxicillin daily that he is on chronically     Hyperglycemia: Glucose elevation has ranged from 120-180 on previous BMPs.  Added on A1c that is 6.0.  No history of diabetes.  -Follow-up with PCP    Elevated BP  BP intermittently elevated here, no history of HTN  -monitor as outpatient with PCP    Pending Results   None    Code Status   Full Code       Primary Care Physician   Dony Cochran    Exam:    T: 98.3, P: 77 bpm, BP: 162/107, R: 14, O2: 93%  General Appearance:  just getting out of shower upon entering room, pleasant, NAD  Respiratory: CTAB without wheezing or rales  Cardiovascular: RRR without murmur or rub  GI: soft, nontender, nondistended, normoactive bowel sounds   Skin: warm, dry    Discharge Disposition   Discharged to home    Consultations This Hospital Stay   None    Time Spent on this Encounter   I, Fallon Desai PA-C, personally saw the patient today and spent greater than  30 minutes discharging this patient.    Discharge Orders      Reason for your hospital stay    You were admitted due to concerns for intractable nausea with vomiting related to cyclic vomiting syndrome from likely cannabinoid hyperemesis.  Initial workup showed that your blood was acidic, your ketones were elevated, and you had an elevated white blood cell count.  Your labs improved on serial draws after IV fluids.  You were treated with antinausea medications and able to tolerate oral intake prior to discharge.  It is recommended that you abstain from marijuana use to prevent recurrent symptoms.     Follow-up and recommended labs and tests     Follow up with primary care provider, Dony Cochran, if needed for ongoing symptoms.     Activity    Your activity upon discharge: activity as tolerated     Diet    Follow this diet upon discharge: Orders Placed This Encounter      Regular Diet Adult     Discharge Medications   Current Discharge Medication List        START taking these medications    Details   prochlorperazine (COMPAZINE) 10 MG tablet Take 1 tablet (10 mg) by mouth every 6 hours as needed for vomiting or nausea  Qty: 16 tablet, Refills: 0    Associated Diagnoses: Cyclic vomiting syndrome           CONTINUE these medications which have NOT CHANGED    Details   amoxicillin (AMOXIL) 250 MG capsule Take 250 mg by mouth daily Long term      aspirin 81 MG EC tablet Take 81 mg by mouth daily      albuterol (PROAIR HFA/PROVENTIL HFA/VENTOLIN HFA) 108 (90 Base) MCG/ACT inhaler Inhale 2 puffs into the lungs every 6 hours as needed for shortness of breath / dyspnea or wheezing  Qty: 18 g, Refills: 0    Comments: Pharmacy may dispense brand covered by insurance (Proair, or proventil or ventolin or generic albuterol inhaler)  Associated Diagnoses: Cough           Allergies   No Known Allergies    Data   Results for orders placed or performed during the hospital encounter of 04/09/24   Comprehensive metabolic panel      Status: Abnormal   Result Value Ref Range    Sodium 141 135 - 145 mmol/L    Potassium 4.0 3.4 - 5.3 mmol/L    Carbon Dioxide (CO2) 16 (L) 22 - 29 mmol/L    Anion Gap 20 (H) 7 - 15 mmol/L    Urea Nitrogen 21.3 (H) 6.0 - 20.0 mg/dL    Creatinine 0.86 0.67 - 1.17 mg/dL    GFR Estimate >90 >60 mL/min/1.73m2    Calcium 9.2 8.6 - 10.0 mg/dL    Chloride 105 98 - 107 mmol/L    Glucose 126 (H) 70 - 99 mg/dL    Alkaline Phosphatase 78 40 - 150 U/L    AST 26 0 - 45 U/L    ALT 26 0 - 70 U/L    Protein Total 7.6 6.4 - 8.3 g/dL    Albumin 4.8 3.5 - 5.2 g/dL    Bilirubin Total 1.3 (H) <=1.2 mg/dL   Lipase     Status: Normal   Result Value Ref Range    Lipase 21 13 - 60 U/L   Extra Tube ([facility] Draw)     Status: None    Narrative    The following orders were created for panel order Extra Tube ([facility] Draw).  Procedure                               Abnormality         Status                     ---------                               -----------         ------                     Extra Blue Top Tube[[id number]]                              Final result               Extra Red Top Tube[[id number]]                               Final result                 Please view results for these tests on the individual orders.   CBC with platelets and differential     Status: Abnormal   Result Value Ref Range    WBC Count 16.2 (H) 4.0 - 11.0 10e3/uL    RBC Count 4.84 4.40 - 5.90 10e6/uL    Hemoglobin 17.0 13.3 - 17.7 g/dL    Hematocrit 47.3 40.0 - 53.0 %    MCV 98 78 - 100 fL    MCH 35.1 (H) 26.5 - 33.0 pg    MCHC 35.9 31.5 - 36.5 g/dL    RDW 14.2 10.0 - 15.0 %    Platelet Count 174 150 - 450 10e3/uL    % Neutrophils 82 %    % Lymphocytes 8 %    % Monocytes 9 %    % Eosinophils 0 %    % Basophils 0 %    % Immature Granulocytes 1 %    NRBCs per 100 WBC 0 <1 /100    Absolute Neutrophils 13.5 (H) 1.6 - 8.3 10e3/uL    Absolute Lymphocytes 1.2 0.8 - 5.3 10e3/uL    Absolute Monocytes 1.4 (H) 0.0 - 1.3 10e3/uL    Absolute Eosinophils 0.0 0.0 - 0.7  10e3/uL    Absolute Basophils 0.0 0.0 - 0.2 10e3/uL    Absolute Immature Granulocytes 0.1 <=0.4 10e3/uL    Absolute NRBCs 0.0 10e3/uL   Extra Blue Top Tube     Status: None   Result Value Ref Range    Hold Specimen JIC    Extra Red Top Tube     Status: None   Result Value Ref Range    Hold Specimen JIC    Ketone Beta-Hydroxybutyrate Quantitative     Status: Abnormal   Result Value Ref Range    Ketone (Beta-Hydroxybutyrate) Quantitative 1.00 (H) <=0.30 mmol/L   Hemoglobin A1c     Status: Abnormal   Result Value Ref Range    Hemoglobin A1C 6.0 (H) <5.7 %   Magnesium Lab     Status: Normal   Result Value Ref Range    Magnesium 1.9 1.7 - 2.3 mg/dL   Basic metabolic panel     Status: Abnormal   Result Value Ref Range    Sodium 141 135 - 145 mmol/L    Potassium 4.1 3.4 - 5.3 mmol/L    Chloride 106 98 - 107 mmol/L    Carbon Dioxide (CO2) 18 (L) 22 - 29 mmol/L    Anion Gap 17 (H) 7 - 15 mmol/L    Urea Nitrogen 18.1 6.0 - 20.0 mg/dL    Creatinine 0.84 0.67 - 1.17 mg/dL    GFR Estimate >90 >60 mL/min/1.73m2    Calcium 9.0 8.6 - 10.0 mg/dL    Glucose 118 (H) 70 - 99 mg/dL   CBC with platelets     Status: Abnormal   Result Value Ref Range    WBC Count 29.7 (H) 4.0 - 11.0 10e3/uL    RBC Count 4.74 4.40 - 5.90 10e6/uL    Hemoglobin 16.8 13.3 - 17.7 g/dL    Hematocrit 46.9 40.0 - 53.0 %    MCV 99 78 - 100 fL    MCH 35.4 (H) 26.5 - 33.0 pg    MCHC 35.8 31.5 - 36.5 g/dL    RDW 14.6 10.0 - 15.0 %    Platelet Count 168 150 - 450 10e3/uL   Magnesium     Status: Normal   Result Value Ref Range    Magnesium 2.0 1.7 - 2.3 mg/dL   Basic metabolic panel     Status: Abnormal   Result Value Ref Range    Sodium 141 135 - 145 mmol/L    Potassium 3.6 3.4 - 5.3 mmol/L    Chloride 104 98 - 107 mmol/L    Carbon Dioxide (CO2) 21 (L) 22 - 29 mmol/L    Anion Gap 16 (H) 7 - 15 mmol/L    Urea Nitrogen 12.7 6.0 - 20.0 mg/dL    Creatinine 0.81 0.67 - 1.17 mg/dL    GFR Estimate >90 >60 mL/min/1.73m2    Calcium 9.1 8.6 - 10.0 mg/dL    Glucose 113 (H) 70 -  99 mg/dL   Magnesium     Status: Normal   Result Value Ref Range    Magnesium 2.0 1.7 - 2.3 mg/dL   CBC with platelets and differential     Status: Abnormal   Result Value Ref Range    WBC Count 11.2 (H) 4.0 - 11.0 10e3/uL    RBC Count 4.68 4.40 - 5.90 10e6/uL    Hemoglobin 16.3 13.3 - 17.7 g/dL    Hematocrit 47.2 40.0 - 53.0 %     (H) 78 - 100 fL    MCH 34.8 (H) 26.5 - 33.0 pg    MCHC 34.5 31.5 - 36.5 g/dL    RDW 14.6 10.0 - 15.0 %    Platelet Count 152 150 - 450 10e3/uL    % Neutrophils 75 %    % Lymphocytes 16 %    % Monocytes 9 %    % Eosinophils 0 %    % Basophils 0 %    % Immature Granulocytes 0 %    NRBCs per 100 WBC 0 <1 /100    Absolute Neutrophils 8.3 1.6 - 8.3 10e3/uL    Absolute Lymphocytes 1.8 0.8 - 5.3 10e3/uL    Absolute Monocytes 1.0 0.0 - 1.3 10e3/uL    Absolute Eosinophils 0.0 0.0 - 0.7 10e3/uL    Absolute Basophils 0.0 0.0 - 0.2 10e3/uL    Absolute Immature Granulocytes 0.0 <=0.4 10e3/uL    Absolute NRBCs 0.0 10e3/uL   Basic metabolic panel     Status: Abnormal   Result Value Ref Range    Sodium 137 135 - 145 mmol/L    Potassium 4.0 3.4 - 5.3 mmol/L    Chloride 100 98 - 107 mmol/L    Carbon Dioxide (CO2) 20 (L) 22 - 29 mmol/L    Anion Gap 17 (H) 7 - 15 mmol/L    Urea Nitrogen 10.5 6.0 - 20.0 mg/dL    Creatinine 0.79 0.67 - 1.17 mg/dL    GFR Estimate >90 >60 mL/min/1.73m2    Calcium 9.2 8.6 - 10.0 mg/dL    Glucose 133 (H) 70 - 99 mg/dL   Magnesium     Status: Normal   Result Value Ref Range    Magnesium 2.0 1.7 - 2.3 mg/dL   EKG 12 lead     Status: None   Result Value Ref Range    Systolic Blood Pressure  mmHg    Diastolic Blood Pressure  mmHg    Ventricular Rate 92 BPM    Atrial Rate 92 BPM    UT Interval 292 ms    QRS Duration 96 ms     ms    QTc 467 ms    P Axis 63 degrees    R AXIS 262 degrees    T Axis 88 degrees    Interpretation ECG       Sinus rhythm with 1st degree A-V block with Premature atrial complexes  Incomplete right bundle branch block , plus right ventricular  hypertrophy  Possible Lateral infarct , age undetermined  Abnormal ECG  When compared with ECG of 07-OCT-2023 22:17,  Premature atrial complexes are now Present  OK interval has increased  Borderline criteria for Lateral infarct are now Present  T wave inversion less evident in Anterior leads  Confirmed by - EMERGENCY ROOM, PHYSICIAN ([id number]),  KORTNEY ORTIZ ([id number]) on 4/10/2024 6:39:02 AM     CBC with platelets differential     Status: Abnormal    Narrative    The following orders were created for panel order CBC with platelets differential.  Procedure                               Abnormality         Status                     ---------                               -----------         ------                     CBC with platelets and d...[[id number]]  Abnormal            Final result                 Please view results for these tests on the individual orders.   CBC with Platelets & Differential     Status: Abnormal    Narrative    The following orders were created for panel order CBC with Platelets & Differential.  Procedure                               Abnormality         Status                     ---------                               -----------         ------                     CBC with platelets and d...[[id number]]  Abnormal            Final result                 Please view results for these tests on the individual orders.     Fallon Desai PA-C

## 2024-04-12 NOTE — PLAN OF CARE
"  +BM. Showering often. Nausea intermittent. Clear liquids plus hesham crackers. AxOx4. Denies pain. No PRNs given over night. Independent in room. Encouraged to ambulate halls. Mag and K+ WDL, do not require replacement.     Goal Outcome Evaluation:      Plan of Care Reviewed With: patient    Overall Patient Progress: improvingOverall Patient Progress: improving    Outcome Evaluation: Nausea intermittent. No PRNs over night.      Problem: Adult Inpatient Plan of Care  Goal: Plan of Care Review  Description: The Plan of Care Review/Shift note should be completed every shift.  The Outcome Evaluation is a brief statement about your assessment that the patient is improving, declining, or no change.  This information will be displayed automatically on your shift  note.  Outcome: Progressing  Flowsheets (Taken 4/12/2024 1131)  Outcome Evaluation: Nausea intermittent. No PRNs over night.  Plan of Care Reviewed With: patient  Overall Patient Progress: improving  Goal: Patient-Specific Goal (Individualized)  Description: You can add care plan individualizations to a care plan. Examples of Individualization might be:  \"Parent requests to be called daily at 9am for status\", \"I have a hard time hearing out of my right ear\", or \"Do not touch me to wake me up as it startles  me\".  Outcome: Progressing  Goal: Absence of Hospital-Acquired Illness or Injury  Outcome: Progressing  Intervention: Identify and Manage Fall Risk  Recent Flowsheet Documentation  Taken 4/12/2024 0900 by Julisa Kapadia RN  Safety Promotion/Fall Prevention:   treat underlying cause   treat reversible contributory factors   safety round/check completed   room organization consistent   patient and family education   nonskid shoes/slippers when out of bed   clutter free environment maintained  Intervention: Prevent Skin Injury  Recent Flowsheet Documentation  Taken 4/12/2024 0900 by Julisa Kapadia RN  Body Position: position changed " independently  Intervention: Prevent and Manage VTE (Venous Thromboembolism) Risk  Recent Flowsheet Documentation  Taken 4/12/2024 0900 by Julisa Kapadia RN  VTE Prevention/Management: (Pt ambulating) SCDs (sequential compression devices) off  Intervention: Prevent Infection  Recent Flowsheet Documentation  Taken 4/12/2024 0900 by Julisa Kapadia RN  Infection Prevention: rest/sleep promoted  Goal: Optimal Comfort and Wellbeing  Outcome: Progressing  Goal: Readiness for Transition of Care  Outcome: Progressing     Problem: Nausea and Vomiting  Goal: Nausea and Vomiting Relief  Outcome: Progressing  Intervention: Prevent and Manage Nausea and Vomiting  Recent Flowsheet Documentation  Taken 4/12/2024 0900 by Julisa Kapadia RN  Fluid/Electrolyte Management: fluids provided  Nausea/Vomiting Interventions: (encouraged to ambulate, shower)   stimuli minimized   slow deep breathing encouraged   sips of clear liquids given     Problem: Comorbidity Management  Goal: Maintenance of Behavioral Health Symptom Control  Outcome: Progressing  Intervention: Maintain Behavioral Health Symptom Control  Recent Flowsheet Documentation  Taken 4/12/2024 0900 by Julisa Kapadia RN  Medication Review/Management: medications reviewed

## 2024-04-12 NOTE — PLAN OF CARE
"  Problem: Adult Inpatient Plan of Care  Goal: Plan of Care Review    Patient is Alert and Oriented x4. independent with no assistive devices . Showered x5 this shift, verbalized feels relief when showers.  Pt is a Clear diet unable to tolerate advanced diet. denying pain.  Patient has Lactated Ringer's running at 100 mL per hour.  Outcome: Progressing  Goal: Patient-Specific Goal (Individualized)  Description: You can add care plan individualizations to a care plan. Examples of Individualization might be:  \"Parent requests to be called daily at 9am for status\", \"I have a hard time hearing out of my right ear\", or \"Do not touch me to wake me up as it startles  me\".  Outcome: Progressing  Goal: Absence of Hospital-Acquired Illness or Injury  Outcome: Progressing  Intervention: Prevent Skin Injury  Recent Flowsheet Documentation  Taken 4/11/2024 2010 by Adalgisa Escobedo RN  Body Position: position changed independently  Goal: Optimal Comfort and Wellbeing  Outcome: Progressing  Goal: Readiness for Transition of Care  Outcome: Progressing     Problem: Nausea and Vomiting  Goal: Nausea and Vomiting Relief  Outcome: Progressing     Problem: Comorbidity Management  Goal: Maintenance of Behavioral Health Symptom Control  Outcome: Progressing   Goal Outcome Evaluation:                        "

## 2024-04-12 NOTE — PLAN OF CARE
Patient's After Visit Summary was reviewed with patient and/or father.   Patient verbalized understanding of After Visit Summary, recommended follow up and was given an opportunity to ask questions.   Discharge medications sent home with patient/family: No   Discharged with father    Goal Outcome Evaluation:      Plan of Care Reviewed With: patient    Overall Patient Progress: improvingOverall Patient Progress: improving    Outcome Evaluation: Nausea intermittent. Attempting non-pharmaceutical interventions.

## 2024-07-11 ENCOUNTER — MYC REFILL (OUTPATIENT)
Dept: FAMILY MEDICINE | Facility: CLINIC | Age: 29
End: 2024-07-11
Payer: COMMERCIAL

## 2024-07-12 RX ORDER — AMOXICILLIN 250 MG/1
250 CAPSULE ORAL DAILY
OUTPATIENT
Start: 2024-07-12

## 2024-08-11 ENCOUNTER — HEALTH MAINTENANCE LETTER (OUTPATIENT)
Age: 29
End: 2024-08-11

## 2025-01-20 ENCOUNTER — HOSPITAL ENCOUNTER (EMERGENCY)
Facility: CLINIC | Age: 30
Discharge: HOME | End: 2025-01-20
Attending: EMERGENCY MEDICINE | Admitting: EMERGENCY MEDICINE
Payer: COMMERCIAL

## 2025-01-20 ENCOUNTER — TRANSFERRED RECORDS (OUTPATIENT)
Dept: HEALTH INFORMATION MANAGEMENT | Facility: CLINIC | Age: 30
End: 2025-01-20

## 2025-01-20 ENCOUNTER — APPOINTMENT (OUTPATIENT)
Dept: GENERAL RADIOLOGY | Facility: CLINIC | Age: 30
End: 2025-01-20
Attending: EMERGENCY MEDICINE
Payer: COMMERCIAL

## 2025-01-20 VITALS
TEMPERATURE: 97.6 F | RESPIRATION RATE: 16 BRPM | DIASTOLIC BLOOD PRESSURE: 89 MMHG | BODY MASS INDEX: 24.35 KG/M2 | OXYGEN SATURATION: 95 % | WEIGHT: 164.9 LBS | SYSTOLIC BLOOD PRESSURE: 129 MMHG | HEART RATE: 100 BPM

## 2025-01-20 DIAGNOSIS — D72.829 LEUKOCYTOSIS, UNSPECIFIED TYPE: ICD-10-CM

## 2025-01-20 DIAGNOSIS — S22.31XA CLOSED FRACTURE OF ONE RIB OF RIGHT SIDE, INITIAL ENCOUNTER: ICD-10-CM

## 2025-01-20 DIAGNOSIS — R07.89 CHEST WALL PAIN: ICD-10-CM

## 2025-01-20 LAB
ALBUMIN SERPL BCG-MCNC: 4.7 G/DL (ref 3.5–5.2)
ALP SERPL-CCNC: 133 U/L (ref 40–150)
ALT SERPL W P-5'-P-CCNC: 23 U/L (ref 0–70)
ANION GAP SERPL CALCULATED.3IONS-SCNC: 17 MMOL/L (ref 7–15)
AST SERPL W P-5'-P-CCNC: 27 U/L (ref 0–45)
BASOPHILS # BLD AUTO: 0.1 10E3/UL (ref 0–0.2)
BASOPHILS NFR BLD AUTO: 0 %
BILIRUB SERPL-MCNC: 0.8 MG/DL
BUN SERPL-MCNC: 18.6 MG/DL (ref 6–20)
CALCIUM SERPL-MCNC: 9.6 MG/DL (ref 8.8–10.4)
CHLORIDE SERPL-SCNC: 102 MMOL/L (ref 98–107)
CREAT SERPL-MCNC: 0.85 MG/DL (ref 0.67–1.17)
EGFRCR SERPLBLD CKD-EPI 2021: >90 ML/MIN/1.73M2
EOSINOPHIL # BLD AUTO: 0.2 10E3/UL (ref 0–0.7)
EOSINOPHIL NFR BLD AUTO: 1 %
ERYTHROCYTE [DISTWIDTH] IN BLOOD BY AUTOMATED COUNT: 16.6 % (ref 10–15)
GLUCOSE SERPL-MCNC: 121 MG/DL (ref 70–99)
HCO3 SERPL-SCNC: 16 MMOL/L (ref 22–29)
HCT VFR BLD AUTO: 38.5 % (ref 40–53)
HGB BLD-MCNC: 13.4 G/DL (ref 13.3–17.7)
HOLD SPECIMEN: NORMAL
HOLD SPECIMEN: NORMAL
IMM GRANULOCYTES # BLD: 0.2 10E3/UL
IMM GRANULOCYTES NFR BLD: 1 %
LACTATE SERPL-SCNC: 0.9 MMOL/L (ref 0.7–2)
LIPASE SERPL-CCNC: 16 U/L (ref 13–60)
LYMPHOCYTES # BLD AUTO: 1.5 10E3/UL (ref 0.8–5.3)
LYMPHOCYTES NFR BLD AUTO: 6 %
MCH RBC QN AUTO: 34.4 PG (ref 26.5–33)
MCHC RBC AUTO-ENTMCNC: 34.8 G/DL (ref 31.5–36.5)
MCV RBC AUTO: 99 FL (ref 78–100)
MONOCYTES # BLD AUTO: 1.9 10E3/UL (ref 0–1.3)
MONOCYTES NFR BLD AUTO: 7 %
NEUTROPHILS # BLD AUTO: 22.2 10E3/UL (ref 1.6–8.3)
NEUTROPHILS NFR BLD AUTO: 85 %
NRBC # BLD AUTO: 0 10E3/UL
NRBC BLD AUTO-RTO: 0 /100
PLATELET # BLD AUTO: 393 10E3/UL (ref 150–450)
POTASSIUM SERPL-SCNC: 3.8 MMOL/L (ref 3.4–5.3)
PROT SERPL-MCNC: 8.2 G/DL (ref 6.4–8.3)
RBC # BLD AUTO: 3.9 10E6/UL (ref 4.4–5.9)
SODIUM SERPL-SCNC: 135 MMOL/L (ref 135–145)
TROPONIN T SERPL HS-MCNC: 12 NG/L
TROPONIN T SERPL HS-MCNC: 12 NG/L
WBC # BLD AUTO: 26 10E3/UL (ref 4–11)

## 2025-01-20 PROCEDURE — 87040 BLOOD CULTURE FOR BACTERIA: CPT | Performed by: EMERGENCY MEDICINE

## 2025-01-20 PROCEDURE — 250N000011 HC RX IP 250 OP 636: Performed by: EMERGENCY MEDICINE

## 2025-01-20 PROCEDURE — 36415 COLL VENOUS BLD VENIPUNCTURE: CPT | Performed by: EMERGENCY MEDICINE

## 2025-01-20 PROCEDURE — 83605 ASSAY OF LACTIC ACID: CPT | Performed by: EMERGENCY MEDICINE

## 2025-01-20 PROCEDURE — 82374 ASSAY BLOOD CARBON DIOXIDE: CPT | Performed by: EMERGENCY MEDICINE

## 2025-01-20 PROCEDURE — 84484 ASSAY OF TROPONIN QUANT: CPT | Performed by: EMERGENCY MEDICINE

## 2025-01-20 PROCEDURE — 250N000013 HC RX MED GY IP 250 OP 250 PS 637: Performed by: EMERGENCY MEDICINE

## 2025-01-20 PROCEDURE — 99285 EMERGENCY DEPT VISIT HI MDM: CPT | Mod: 25 | Performed by: EMERGENCY MEDICINE

## 2025-01-20 PROCEDURE — 96365 THER/PROPH/DIAG IV INF INIT: CPT | Performed by: EMERGENCY MEDICINE

## 2025-01-20 PROCEDURE — 71046 X-RAY EXAM CHEST 2 VIEWS: CPT

## 2025-01-20 PROCEDURE — 83690 ASSAY OF LIPASE: CPT | Performed by: EMERGENCY MEDICINE

## 2025-01-20 PROCEDURE — 85004 AUTOMATED DIFF WBC COUNT: CPT | Performed by: EMERGENCY MEDICINE

## 2025-01-20 PROCEDURE — 96375 TX/PRO/DX INJ NEW DRUG ADDON: CPT | Performed by: EMERGENCY MEDICINE

## 2025-01-20 PROCEDURE — 93005 ELECTROCARDIOGRAM TRACING: CPT | Performed by: EMERGENCY MEDICINE

## 2025-01-20 PROCEDURE — 82947 ASSAY GLUCOSE BLOOD QUANT: CPT | Performed by: EMERGENCY MEDICINE

## 2025-01-20 RX ORDER — DIAZEPAM 10 MG/2ML
5 INJECTION, SOLUTION INTRAMUSCULAR; INTRAVENOUS ONCE
Status: COMPLETED | OUTPATIENT
Start: 2025-01-20 | End: 2025-01-20

## 2025-01-20 RX ORDER — AMOXICILLIN 250 MG/1
250 CAPSULE ORAL DAILY
Qty: 30 CAPSULE | Refills: 0 | Status: SHIPPED | OUTPATIENT
Start: 2025-02-01 | End: 2025-03-03

## 2025-01-20 RX ORDER — LIDOCAINE 4 G/G
1 PATCH TOPICAL ONCE
Status: DISCONTINUED | OUTPATIENT
Start: 2025-01-20 | End: 2025-01-20 | Stop reason: HOSPADM

## 2025-01-20 RX ORDER — LIDOCAINE 50 MG/G
1 PATCH TOPICAL EVERY 24 HOURS
Qty: 10 PATCH | Refills: 0 | Status: SHIPPED | OUTPATIENT
Start: 2025-01-20 | End: 2025-01-30

## 2025-01-20 RX ORDER — OXYCODONE HYDROCHLORIDE 5 MG/1
5 TABLET ORAL EVERY 6 HOURS PRN
Qty: 12 TABLET | Refills: 0 | Status: SHIPPED | OUTPATIENT
Start: 2025-01-20 | End: 2025-01-23

## 2025-01-20 RX ORDER — KETOROLAC TROMETHAMINE 15 MG/ML
15 INJECTION, SOLUTION INTRAMUSCULAR; INTRAVENOUS ONCE
Status: COMPLETED | OUTPATIENT
Start: 2025-01-20 | End: 2025-01-20

## 2025-01-20 RX ORDER — SENNA AND DOCUSATE SODIUM 50; 8.6 MG/1; MG/1
1-2 TABLET, FILM COATED ORAL 2 TIMES DAILY PRN
Qty: 30 TABLET | Refills: 0 | Status: SHIPPED | OUTPATIENT
Start: 2025-01-20 | End: 2025-02-19

## 2025-01-20 RX ORDER — CEFTRIAXONE 2 G/1
2 INJECTION, POWDER, FOR SOLUTION INTRAMUSCULAR; INTRAVENOUS ONCE
Status: COMPLETED | OUTPATIENT
Start: 2025-01-20 | End: 2025-01-20

## 2025-01-20 RX ORDER — OXYCODONE HYDROCHLORIDE 5 MG/1
10 TABLET ORAL ONCE
Status: COMPLETED | OUTPATIENT
Start: 2025-01-20 | End: 2025-01-20

## 2025-01-20 RX ADMIN — CEFTRIAXONE 2 G: 2 INJECTION, POWDER, FOR SOLUTION INTRAMUSCULAR; INTRAVENOUS at 18:36

## 2025-01-20 RX ADMIN — OXYCODONE HYDROCHLORIDE 10 MG: 5 TABLET ORAL at 19:16

## 2025-01-20 RX ADMIN — LIDOCAINE 1 PATCH: 4 PATCH TOPICAL at 19:16

## 2025-01-20 RX ADMIN — KETOROLAC TROMETHAMINE 15 MG: 15 INJECTION, SOLUTION INTRAMUSCULAR; INTRAVENOUS at 17:17

## 2025-01-20 RX ADMIN — DIAZEPAM 5 MG: 5 INJECTION INTRAMUSCULAR; INTRAVENOUS at 17:17

## 2025-01-20 ASSESSMENT — COLUMBIA-SUICIDE SEVERITY RATING SCALE - C-SSRS
6. HAVE YOU EVER DONE ANYTHING, STARTED TO DO ANYTHING, OR PREPARED TO DO ANYTHING TO END YOUR LIFE?: NO
2. HAVE YOU ACTUALLY HAD ANY THOUGHTS OF KILLING YOURSELF IN THE PAST MONTH?: NO
1. IN THE PAST MONTH, HAVE YOU WISHED YOU WERE DEAD OR WISHED YOU COULD GO TO SLEEP AND NOT WAKE UP?: NO

## 2025-01-20 ASSESSMENT — ACTIVITIES OF DAILY LIVING (ADL)
ADLS_ACUITY_SCORE: 56

## 2025-01-20 NOTE — ED TRIAGE NOTES
"Around 12/10 Pt was diagnosed with rotavirus. Pt vomited for around 2 weeks. Pt then developed chest pain, went to urgent care and was diagnosed with cracked rib on the right on 12/24. Since 12/24 Pt has been taking 1800 mg of ibuprofen a day along with 3000 mg of tylenol a day. Pt woke up today around 0300 with \"severe shoulder pain, that radiates down the bicep into the chest and neck.\"     Pt did go to  today and was advised to go to the ER. Pt does have a WBC count of 16, an elevated troponin, and possible EKG changes.     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     Pt did receive 324 mg of asa and 75 mcg of Fentanyl from EMS.         "

## 2025-01-20 NOTE — ED PROVIDER NOTES
Emergency Department Note      History of Present Illness     Chief Complaint   Chest Pain      HPI   HPI   Bassam Zuñiga is a 29 year old male with a past medical history significant for congenital heart abnormality status post repair  who presents to the ED via/accompanied by parents with a chief complaint of right superior anterior chest pain radiating to back and down arm.  Patient reports that he was vomiting around 25 December and developed chest pain subsequently being diagnosed with a rib fracture consistent with the location and quality of pain he is experiencing today.  He reports the vomiting has resolved and the pain has been improving until this morning at approximately 0300 awaking him from sleep.  He reports pain is worse with movement such as putting on a shirt and deep breaths.  He denies any new trauma or falls.  He denies new vomiting, nausea, abdominal pain, fevers, chills, cough.  He reports that he has been noncompliant with his medications including his amoxicillin he takes for his asplenia due to financial reasons.  H patient reports that he was e currently follows with pediatric cardiology at [facility].  Referred to the emergency department from urgent care due to concerns regarding his heart and ribs.    Independent Historian   None    Review of External Notes   [facility] for hyperemesis On 10/7/2023    Past Medical History     Medical History and Problem List   Past Medical History:   Diagnosis Date    Asplenia     Cannabis hyperemesis syndrome concurrent with and due to cannabis abuse (H)     Congenital heart anomaly     Heterotaxy syndrome        Medications   [START ON 2/1/2025] amoxicillin (AMOXIL) 250 MG capsule  amoxicillin-clavulanate (AUGMENTIN) 875-125 MG tablet  lidocaine (LIDODERM) 5 % patch  oxyCODONE (ROXICODONE) 5 MG tablet  SENNA-docusate sodium (SENNA S) 8.6-50 MG tablet  albuterol (PROAIR HFA/PROVENTIL HFA/VENTOLIN HFA) 108 (90  Base) MCG/ACT inhaler  amoxicillin (AMOXIL) 250 MG capsule  aspirin 81 MG EC tablet  prochlorperazine (COMPAZINE) 10 MG tablet        Surgical History   Past Surgical History:   Procedure Laterality Date    CARDIAC SURGERY      HERNIA REPAIR         Physical Exam     Patient Vitals for the past 24 hrs:   BP Temp Temp src Pulse Resp SpO2 Weight   01/20/25 1945 (!) 126/100 -- -- 99 -- 94 % --   01/20/25 1915 139/87 -- -- 95 -- 95 % --   01/20/25 1845 134/85 -- -- 94 -- 94 % --   01/20/25 1552 114/67 97.6  F (36.4  C) Oral 90 16 96 % 74.8 kg (164 lb 14.5 oz)     Physical Exam  Constitutional: Well developed, nontox appearance  Head: Atraumatic.   Mouth/Throat: Oropharynx is clear and moist.   Neck:  no stridor  Eyes: no scleral icterus  Cardiovascular: RRR, 2+ bilat radial pulses  Pulmonary/Chest: nml resp effort, Clear and equal BS bilat, focal exquisite chest tenderness overlying the right superior anterior chest,   Abdominal: ND, soft, NT, no rebound or guarding   Ext: Warm, well perfused, no edema  Neurological: A&O, symmetric facies, moves ext x4  Skin: Skin is warm and dry.   Psychiatric: Behavior is normal. Thought content normal.   Nursing note and vitals reviewed.    Diagnostics     Lab Results   Labs Ordered and Resulted from Time of ED Arrival to Time of ED Departure   COMPREHENSIVE METABOLIC PANEL - Abnormal       Result Value    Sodium 135      Potassium 3.8      Carbon Dioxide (CO2) 16 (*)     Anion Gap 17 (*)     Urea Nitrogen 18.6      Creatinine 0.85      GFR Estimate >90      Calcium 9.6      Chloride 102      Glucose 121 (*)     Alkaline Phosphatase 133      AST 27      ALT 23      Protein Total 8.2      Albumin 4.7      Bilirubin Total 0.8     CBC WITH PLATELETS AND DIFFERENTIAL - Abnormal    WBC Count 26.0 (*)     RBC Count 3.90 (*)     Hemoglobin 13.4      Hematocrit 38.5 (*)     MCV 99      MCH 34.4 (*)     MCHC 34.8      RDW 16.6 (*)     Platelet Count 393      % Neutrophils 85      %  Lymphocytes 6      % Monocytes 7      % Eosinophils 1      % Basophils 0      % Immature Granulocytes 1      NRBCs per 100 WBC 0      Absolute Neutrophils 22.2 (*)     Absolute Lymphocytes 1.5      Absolute Monocytes 1.9 (*)     Absolute Eosinophils 0.2      Absolute Basophils 0.1      Absolute Immature Granulocytes 0.2      Absolute NRBCs 0.0     LIPASE - Normal    Lipase 16     TROPONIN T, HIGH SENSITIVITY - Normal    Troponin T, High Sensitivity 12     TROPONIN T, HIGH SENSITIVITY - Normal    Troponin T, High Sensitivity 12     LACTIC ACID WHOLE BLOOD WITH 1X REPEAT IN 2 HR WHEN >2 - Normal    Lactic Acid, Initial 0.9     BLOOD CULTURE   BLOOD CULTURE       Imaging   XR Chest 2 Views   Final Result   IMPRESSION: Negative chest with no significant change.          EKG   ECG results from 04/09/24   EKG 12 lead     Value    Systolic Blood Pressure     Diastolic Blood Pressure     Ventricular Rate 92    Atrial Rate 92    LA Interval 292    QRS Duration 96        QTc 467    P Axis 63    R AXIS 262    T Axis 88    Interpretation ECG      Sinus rhythm with 1st degree A-V block with Premature atrial complexes  Incomplete right bundle branch block , plus right ventricular hypertrophy  Possible Lateral infarct , age undetermined  Abnormal ECG  When compared with ECG of 07-OCT-2023 22:17,  Premature atrial complexes are now Present  LA interval has increased  Borderline criteria for Lateral infarct are now Present  T wave inversion less evident in Anterior leads  Confirmed by - EMERGENCY ROOM, PHYSICIAN ([id number]),  KORTNEY ORTIZ (Tyler) on 4/10/2024 6:39:02 AM         Independent Interpretation   EKG significant for normal sinus rhythm without acute ischemic findings and comparison to previous from 4/10/2024  Chest x-ray negative for pneumothoraces, possible right second rib fracture noted    ED Course      Medications Administered   Medications   Lidocaine (LIDOCARE) 4 % Patch 1 patch (1 patch Transdermal  $Patch/Med Applied 1/20/25 1916)   diazepam (VALIUM) injection 5 mg (5 mg Intravenous $Given 1/20/25 1717)   ketorolac (TORADOL) injection 15 mg (15 mg Intravenous $Given 1/20/25 1717)   cefTRIAXone (ROCEPHIN) 2 g vial to attach to  ml bag for ADULTS or NS 50 ml bag for PEDS (0 g Intravenous Stopped 1/20/25 1919)   oxyCODONE (ROXICODONE) tablet 10 mg (10 mg Oral $Given 1/20/25 1916)       Procedures   Procedures     Discussion of Management   None    ED Course        Additional Documentation  Social determinants include medication noncompliance secondary to financial issues    Medical Decision Making / Diagnosis     CMS Diagnoses: IV Antibiotics given and/or elevated Lactate of 0.9 and no sepsis note found - Delete this reminder and enter the sepsis note or '.edcms' before signing chart.>>>None    MIPS       None    MDM     29 year old male presenting w/ reproducible chest tenderness    Differential diagnosis includes chest wall pain, subacute rib fracture, muscle strain, muscle spasm.  Doubt dissection, PE, ACS, CHF given history, physical exam.  EKG interpretation as noted above.  Labs largely unremarkable other than leukocytosis.  No evidence of pneumonia on chest x-ray.  Unknown etiology of the patient's leukocytosis although he does not have any fevers and his vital signs are within normal limits.  He has had elevated white blood cell counts in the past for seemingly noninfectious reasons.  Given the patient's asplenic, I think it be reasonable to draw blood cultures and treat him with antibiotics with ceftriaxone given as noted above.  Augmentin prescribed as noted below.  Plan for prescriptions for outpatient pain control.  Given otherwise reassuring workup, at this time I feel the pt is safe for discharge.  Recommendations given regarding follow up with PCP and return to the emergency department as needed for new or worsening symptoms.  Pt and parents counseled on all results, disposition and  diagnosis.  They are understanding and agreeable to plan. Patient discharged in stable condition.        Disposition   The patient was discharged.     Diagnosis     ICD-10-CM    1. Chest wall pain  R07.89 Primary Care Referral      2. Closed fracture of one rib of right side, initial encounter  S22.31XA Primary Care Referral      3. Leukocytosis, unspecified type  D72.829            Discharge Medications   New Prescriptions    AMOXICILLIN (AMOXIL) 250 MG CAPSULE    Take 1 capsule (250 mg) by mouth daily.    AMOXICILLIN-CLAVULANATE (AUGMENTIN) 875-125 MG TABLET    Take 1 tablet by mouth 2 times daily for 19 doses.    LIDOCAINE (LIDODERM) 5 % PATCH    Place 1 patch over 12 hours onto the skin every 24 hours for 10 days.    OXYCODONE (ROXICODONE) 5 MG TABLET    Take 1 tablet (5 mg) by mouth every 6 hours as needed for breakthrough pain or severe pain.    SENNA-DOCUSATE SODIUM (SENNA S) 8.6-50 MG TABLET    Take 1-2 tablets by mouth 2 times daily as needed (if taking oxycodone).         MD Donavan Marie Christopher E, MD  01/20/25 1955

## 2025-01-20 NOTE — Clinical Note
Bassam Zuñiga was seen and treated in our emergency department on 1/20/2025.  He may return to work on 01/24/2025.  Please excuse Bassam from work for the next 4 days.     If you have any questions or concerns, please don't hesitate to call.      Donell Go MD

## 2025-01-21 LAB
ATRIAL RATE - MUSE: 90 BPM
DIASTOLIC BLOOD PRESSURE - MUSE: NORMAL MMHG
INTERPRETATION ECG - MUSE: NORMAL
P AXIS - MUSE: NORMAL DEGREES
PR INTERVAL - MUSE: 172 MS
QRS DURATION - MUSE: 90 MS
QT - MUSE: 374 MS
QTC - MUSE: 457 MS
R AXIS - MUSE: 265 DEGREES
SYSTOLIC BLOOD PRESSURE - MUSE: NORMAL MMHG
T AXIS - MUSE: 103 DEGREES
VENTRICULAR RATE- MUSE: 90 BPM

## 2025-01-21 NOTE — DISCHARGE INSTRUCTIONS
-Take acetaminophen 500 to 1000 mg by mouth every 4 to 6 hours as needed for pain or fever.  Do not take more than 4000 mg in 24 hours.  Do not take within 6 hours of another acetaminophen containing medication such as norco (vicodin) or percocet.  - Take ibuprofen 600 to 800 mg by mouth every 6 to 8 hours as needed for pain or fever    Discharge Instructions  Chest Injury    You have been seen today because of a chest injury.  You may have contusion (bruise) of the chest or a rib fracture (broken bone).  Rib fractures can be hard to see on x-ray, so we cannot always be sure whether your rib is broken or bruised. Fortunately, the treatment of these injuries is usually the same, and includes pain control and preventing complications.    Generally, every Emergency Department visit should have a follow-up clinic visit with either a primary or a specialty clinic/provider. Please follow-up as instructed by your emergency provider today.    Return to the Emergency Department if:  You become short of breath.  You develop a fever over 101.5 F.  You pass out or become very weak or pale.  You have abdominal (belly) pain that is new or increasing.  You cough up blood.  You have new symptoms or anything that worries you.    Follow-up with your provider:  As directed by your provider today.  If you are not improved in two weeks.  If you need more pain medicine, since we do not refill pain pills through the Emergency Department.    Home care instructions:  Chest injuries can be painful.  You may take an over-the-counter pain medication such as Tylenol  (acetaminophen), Advil  (ibuprofen), Motrin  (ibuprofen) or Aleve  (naproxen).  Applying ice packs to the painful area can help your pain.   Holding a pillow against your chest can help with pain when you need to move or cough.  You may need to rest and avoid lifting particularly in the first few days after your injury.  Prevention of pneumonia (lung infection) is also a part of  managing chest injuries.  Because it can hurt to take deep breaths, you could develop collapsed areas of lung that can develop infection.  To prevent this, you need to take ten very deep breaths every hour while you are awake. Sometimes you will be given a device called an incentive spirometer to help with this. You also need to make yourself cough every hour.  Rib belts or binders are not generally recommended, since they may increase the risk of pneumonia. If you do use one, use it for only short periods of time.   If you were given a prescription for medicine here today, be sure to read all of the information (including the package insert) that comes with your prescription.  This will include important information about the medicine, its side effects, and any warnings that you need to know about.  The pharmacist who fills the prescription can provide more information and answer questions you may have about the medicine.  If you have questions or concerns that the pharmacist cannot address, please call or return to the Emergency Department.   Remember that you can always come back to the Emergency Department if you are not able to see your regular provider in the amount of time listed above, if you get any new symptoms, or if there is anything that worries you.

## 2025-01-23 LAB
BACTERIA BLD CULT: NORMAL
BACTERIA BLD CULT: NORMAL

## 2025-01-25 LAB
BACTERIA BLD CULT: NO GROWTH
BACTERIA BLD CULT: NO GROWTH

## 2025-08-06 ENCOUNTER — APPOINTMENT (OUTPATIENT)
Dept: CT IMAGING | Facility: CLINIC | Age: 30
End: 2025-08-06
Attending: EMERGENCY MEDICINE
Payer: COMMERCIAL

## 2025-08-06 ENCOUNTER — HOSPITAL ENCOUNTER (INPATIENT)
Facility: CLINIC | Age: 30
End: 2025-08-06
Attending: EMERGENCY MEDICINE | Admitting: INTERNAL MEDICINE
Payer: COMMERCIAL

## 2025-08-06 ENCOUNTER — APPOINTMENT (OUTPATIENT)
Dept: MRI IMAGING | Facility: CLINIC | Age: 30
End: 2025-08-06
Attending: EMERGENCY MEDICINE
Payer: COMMERCIAL

## 2025-08-06 DIAGNOSIS — Q20.6 HETEROTAXY SYNDROME WITH ASPLENIA: ICD-10-CM

## 2025-08-06 DIAGNOSIS — I63.9 ACUTE CVA (CEREBROVASCULAR ACCIDENT) (H): Primary | ICD-10-CM

## 2025-08-06 DIAGNOSIS — Q89.01 HETEROTAXY SYNDROME WITH ASPLENIA: ICD-10-CM

## 2025-08-06 LAB
ANION GAP SERPL CALCULATED.3IONS-SCNC: 16 MMOL/L (ref 7–15)
APTT PPP: 29 SECONDS (ref 22–38)
ATRIAL RATE - MUSE: 61 BPM
BASOPHILS # BLD AUTO: 0.1 10E3/UL (ref 0–0.2)
BASOPHILS NFR BLD AUTO: 1 %
BUN SERPL-MCNC: 16.6 MG/DL (ref 6–20)
CALCIUM SERPL-MCNC: 9.5 MG/DL (ref 8.8–10.4)
CHLORIDE SERPL-SCNC: 104 MMOL/L (ref 98–107)
CHOLEST SERPL-MCNC: 136 MG/DL
CREAT SERPL-MCNC: 0.98 MG/DL (ref 0.67–1.17)
DIASTOLIC BLOOD PRESSURE - MUSE: NORMAL MMHG
EGFRCR SERPLBLD CKD-EPI 2021: >90 ML/MIN/1.73M2
EOSINOPHIL # BLD AUTO: 0.3 10E3/UL (ref 0–0.7)
EOSINOPHIL NFR BLD AUTO: 4 %
ERYTHROCYTE [DISTWIDTH] IN BLOOD BY AUTOMATED COUNT: 15.6 % (ref 10–15)
GLUCOSE BLDC GLUCOMTR-MCNC: 107 MG/DL (ref 70–99)
GLUCOSE BLDC GLUCOMTR-MCNC: 86 MG/DL (ref 70–99)
GLUCOSE SERPL-MCNC: 89 MG/DL (ref 70–99)
HCO3 SERPL-SCNC: 18 MMOL/L (ref 22–29)
HCT VFR BLD AUTO: 49.4 % (ref 40–53)
HDLC SERPL-MCNC: 35 MG/DL
HGB BLD-MCNC: 17.5 G/DL (ref 13.3–17.7)
IMM GRANULOCYTES # BLD: 0 10E3/UL
IMM GRANULOCYTES NFR BLD: 0 %
INR PPP: 1.05 (ref 0.85–1.15)
INTERPRETATION ECG - MUSE: NORMAL
LDLC SERPL CALC-MCNC: 85 MG/DL
LYMPHOCYTES # BLD AUTO: 1.7 10E3/UL (ref 0.8–5.3)
LYMPHOCYTES NFR BLD AUTO: 26 %
MCH RBC QN AUTO: 34.3 PG (ref 26.5–33)
MCHC RBC AUTO-ENTMCNC: 35.4 G/DL (ref 31.5–36.5)
MCV RBC AUTO: 97 FL (ref 78–100)
MONOCYTES # BLD AUTO: 0.7 10E3/UL (ref 0–1.3)
MONOCYTES NFR BLD AUTO: 11 %
NEUTROPHILS # BLD AUTO: 3.7 10E3/UL (ref 1.6–8.3)
NEUTROPHILS NFR BLD AUTO: 57 %
NONHDLC SERPL-MCNC: 101 MG/DL
NRBC # BLD AUTO: 0 10E3/UL
NRBC BLD AUTO-RTO: 0 /100
P AXIS - MUSE: NORMAL DEGREES
PLATELET # BLD AUTO: 227 10E3/UL (ref 150–450)
POTASSIUM SERPL-SCNC: 4.6 MMOL/L (ref 3.4–5.3)
PR INTERVAL - MUSE: 186 MS
PROTHROMBIN TIME: 13.8 SECONDS (ref 11.8–14.8)
QRS DURATION - MUSE: 98 MS
QT - MUSE: 438 MS
QTC - MUSE: 440 MS
R AXIS - MUSE: 255 DEGREES
RADIOLOGIST FLAGS: ABNORMAL
RBC # BLD AUTO: 5.1 10E6/UL (ref 4.4–5.9)
SODIUM SERPL-SCNC: 138 MMOL/L (ref 135–145)
SYSTOLIC BLOOD PRESSURE - MUSE: NORMAL MMHG
T AXIS - MUSE: 88 DEGREES
TRIGL SERPL-MCNC: 79 MG/DL
TROPONIN T SERPL HS-MCNC: <6 NG/L
TROPONIN T SERPL HS-MCNC: <6 NG/L
VENTRICULAR RATE- MUSE: 61 BPM
WBC # BLD AUTO: 6.4 10E3/UL (ref 4–11)

## 2025-08-06 PROCEDURE — 85306 CLOT INHIBIT PROT S FREE: CPT | Performed by: NURSE PRACTITIONER

## 2025-08-06 PROCEDURE — 86146 BETA-2 GLYCOPROTEIN ANTIBODY: CPT | Performed by: PHYSICIAN ASSISTANT

## 2025-08-06 PROCEDURE — 85300 ANTITHROMBIN III ACTIVITY: CPT | Performed by: PHYSICIAN ASSISTANT

## 2025-08-06 PROCEDURE — G0508 CRIT CARE TELEHEA CONSULT 60: HCPCS | Mod: G0 | Performed by: NURSE PRACTITIONER

## 2025-08-06 PROCEDURE — 85390 FIBRINOLYSINS SCREEN I&R: CPT | Mod: 26 | Performed by: PATHOLOGY

## 2025-08-06 PROCEDURE — 80048 BASIC METABOLIC PNL TOTAL CA: CPT | Performed by: EMERGENCY MEDICINE

## 2025-08-06 PROCEDURE — 250N000013 HC RX MED GY IP 250 OP 250 PS 637: Performed by: INTERNAL MEDICINE

## 2025-08-06 PROCEDURE — 255N000002 HC RX 255 OP 636: Performed by: EMERGENCY MEDICINE

## 2025-08-06 PROCEDURE — 70450 CT HEAD/BRAIN W/O DYE: CPT

## 2025-08-06 PROCEDURE — 99223 1ST HOSP IP/OBS HIGH 75: CPT | Performed by: PHYSICIAN ASSISTANT

## 2025-08-06 PROCEDURE — 82465 ASSAY BLD/SERUM CHOLESTEROL: CPT | Performed by: PHYSICIAN ASSISTANT

## 2025-08-06 PROCEDURE — 36415 COLL VENOUS BLD VENIPUNCTURE: CPT | Performed by: PHYSICIAN ASSISTANT

## 2025-08-06 PROCEDURE — 36415 COLL VENOUS BLD VENIPUNCTURE: CPT | Performed by: NURSE PRACTITIONER

## 2025-08-06 PROCEDURE — 36415 COLL VENOUS BLD VENIPUNCTURE: CPT | Performed by: EMERGENCY MEDICINE

## 2025-08-06 PROCEDURE — 70553 MRI BRAIN STEM W/O & W/DYE: CPT

## 2025-08-06 PROCEDURE — 250N000011 HC RX IP 250 OP 636: Performed by: EMERGENCY MEDICINE

## 2025-08-06 PROCEDURE — 120N000001 HC R&B MED SURG/OB

## 2025-08-06 PROCEDURE — 82962 GLUCOSE BLOOD TEST: CPT

## 2025-08-06 PROCEDURE — 85730 THROMBOPLASTIN TIME PARTIAL: CPT | Performed by: EMERGENCY MEDICINE

## 2025-08-06 PROCEDURE — 85610 PROTHROMBIN TIME: CPT | Performed by: EMERGENCY MEDICINE

## 2025-08-06 PROCEDURE — 250N000009 HC RX 250: Performed by: EMERGENCY MEDICINE

## 2025-08-06 PROCEDURE — 250N000013 HC RX MED GY IP 250 OP 250 PS 637: Performed by: EMERGENCY MEDICINE

## 2025-08-06 PROCEDURE — 84484 ASSAY OF TROPONIN QUANT: CPT | Performed by: EMERGENCY MEDICINE

## 2025-08-06 PROCEDURE — 86147 CARDIOLIPIN ANTIBODY EA IG: CPT | Performed by: PHYSICIAN ASSISTANT

## 2025-08-06 PROCEDURE — 85004 AUTOMATED DIFF WBC COUNT: CPT | Performed by: EMERGENCY MEDICINE

## 2025-08-06 PROCEDURE — 70496 CT ANGIOGRAPHY HEAD: CPT

## 2025-08-06 PROCEDURE — 85303 CLOT INHIBIT PROT C ACTIVITY: CPT | Performed by: PHYSICIAN ASSISTANT

## 2025-08-06 PROCEDURE — A9585 GADOBUTROL INJECTION: HCPCS | Performed by: EMERGENCY MEDICINE

## 2025-08-06 PROCEDURE — 93005 ELECTROCARDIOGRAM TRACING: CPT

## 2025-08-06 PROCEDURE — 84484 ASSAY OF TROPONIN QUANT: CPT | Performed by: PHYSICIAN ASSISTANT

## 2025-08-06 PROCEDURE — 99285 EMERGENCY DEPT VISIT HI MDM: CPT | Mod: 25 | Performed by: EMERGENCY MEDICINE

## 2025-08-06 PROCEDURE — 85730 THROMBOPLASTIN TIME PARTIAL: CPT | Performed by: PHYSICIAN ASSISTANT

## 2025-08-06 RX ORDER — ACETAMINOPHEN 325 MG/1
650 TABLET ORAL EVERY 4 HOURS PRN
Status: DISCONTINUED | OUTPATIENT
Start: 2025-08-06 | End: 2025-08-08 | Stop reason: HOSPADM

## 2025-08-06 RX ORDER — ACETAMINOPHEN 325 MG/10.15ML
650 LIQUID ORAL EVERY 4 HOURS PRN
Status: DISCONTINUED | OUTPATIENT
Start: 2025-08-06 | End: 2025-08-08 | Stop reason: HOSPADM

## 2025-08-06 RX ORDER — CLOPIDOGREL BISULFATE 75 MG/1
300 TABLET ORAL ONCE
Status: COMPLETED | OUTPATIENT
Start: 2025-08-06 | End: 2025-08-06

## 2025-08-06 RX ORDER — ACETAMINOPHEN 650 MG/1
650 SUPPOSITORY RECTAL EVERY 4 HOURS PRN
Status: DISCONTINUED | OUTPATIENT
Start: 2025-08-06 | End: 2025-08-08 | Stop reason: HOSPADM

## 2025-08-06 RX ORDER — IOPAMIDOL 755 MG/ML
67 INJECTION, SOLUTION INTRAVASCULAR ONCE
Status: COMPLETED | OUTPATIENT
Start: 2025-08-06 | End: 2025-08-06

## 2025-08-06 RX ORDER — GADOBUTROL 604.72 MG/ML
7 INJECTION INTRAVENOUS ONCE
Status: COMPLETED | OUTPATIENT
Start: 2025-08-06 | End: 2025-08-06

## 2025-08-06 RX ORDER — ASPIRIN 81 MG/1
81 TABLET ORAL DAILY
Status: DISCONTINUED | OUTPATIENT
Start: 2025-08-07 | End: 2025-08-07

## 2025-08-06 RX ORDER — ONDANSETRON 4 MG/1
4 TABLET, ORALLY DISINTEGRATING ORAL EVERY 6 HOURS PRN
Status: DISCONTINUED | OUTPATIENT
Start: 2025-08-06 | End: 2025-08-08 | Stop reason: HOSPADM

## 2025-08-06 RX ORDER — CLOPIDOGREL BISULFATE 75 MG/1
75 TABLET ORAL DAILY
Status: DISCONTINUED | OUTPATIENT
Start: 2025-08-07 | End: 2025-08-07

## 2025-08-06 RX ORDER — ASPIRIN 325 MG
325 TABLET ORAL ONCE
Status: COMPLETED | OUTPATIENT
Start: 2025-08-06 | End: 2025-08-06

## 2025-08-06 RX ORDER — LIDOCAINE 40 MG/G
CREAM TOPICAL
Status: DISCONTINUED | OUTPATIENT
Start: 2025-08-06 | End: 2025-08-08 | Stop reason: HOSPADM

## 2025-08-06 RX ORDER — ONDANSETRON 2 MG/ML
4 INJECTION INTRAMUSCULAR; INTRAVENOUS EVERY 6 HOURS PRN
Status: DISCONTINUED | OUTPATIENT
Start: 2025-08-06 | End: 2025-08-08 | Stop reason: HOSPADM

## 2025-08-06 RX ORDER — DIPHENHYDRAMINE HCL 25 MG
25 CAPSULE ORAL
Status: DISCONTINUED | OUTPATIENT
Start: 2025-08-06 | End: 2025-08-08 | Stop reason: HOSPADM

## 2025-08-06 RX ADMIN — GADOBUTROL 7 ML: 604.72 INJECTION INTRAVENOUS at 10:24

## 2025-08-06 RX ADMIN — SODIUM CHLORIDE 80 ML: 9 INJECTION, SOLUTION INTRAVENOUS at 08:54

## 2025-08-06 RX ADMIN — ASPIRIN 325 MG ORAL TABLET 325 MG: 325 PILL ORAL at 12:26

## 2025-08-06 RX ADMIN — CLOPIDOGREL BISULFATE 300 MG: 75 TABLET, FILM COATED ORAL at 12:26

## 2025-08-06 RX ADMIN — IOPAMIDOL 67 ML: 755 INJECTION, SOLUTION INTRAVENOUS at 08:53

## 2025-08-06 RX ADMIN — Medication 3 MG: at 23:48

## 2025-08-06 ASSESSMENT — ACTIVITIES OF DAILY LIVING (ADL)
ADLS_ACUITY_SCORE: 56
ADLS_ACUITY_SCORE: 35
ADLS_ACUITY_SCORE: 33
ADLS_ACUITY_SCORE: 56
ADLS_ACUITY_SCORE: 33
ADLS_ACUITY_SCORE: 56
ADLS_ACUITY_SCORE: 33
ADLS_ACUITY_SCORE: 33
ADLS_ACUITY_SCORE: 56
ADLS_ACUITY_SCORE: 33
ADLS_ACUITY_SCORE: 56

## 2025-08-07 ENCOUNTER — APPOINTMENT (OUTPATIENT)
Dept: CARDIOLOGY | Facility: CLINIC | Age: 30
End: 2025-08-07
Attending: PHYSICIAN ASSISTANT
Payer: COMMERCIAL

## 2025-08-07 ENCOUNTER — APPOINTMENT (OUTPATIENT)
Dept: PHYSICAL THERAPY | Facility: CLINIC | Age: 30
End: 2025-08-07
Attending: PHYSICIAN ASSISTANT
Payer: COMMERCIAL

## 2025-08-07 ENCOUNTER — TELEPHONE (OUTPATIENT)
Dept: CARDIOLOGY | Facility: CLINIC | Age: 30
End: 2025-08-07
Payer: COMMERCIAL

## 2025-08-07 ENCOUNTER — APPOINTMENT (OUTPATIENT)
Dept: SPEECH THERAPY | Facility: CLINIC | Age: 30
End: 2025-08-07
Attending: PHYSICIAN ASSISTANT
Payer: COMMERCIAL

## 2025-08-07 DIAGNOSIS — Q26.2 INFRADIAPHRAGMATIC TOTAL ANOMALOUS PULMONARY VENOUS RETURN: ICD-10-CM

## 2025-08-07 DIAGNOSIS — Q21.20 SINGLE VENTRICLE WITH UNBALANCED ATRIOVENTRICULAR CANAL: ICD-10-CM

## 2025-08-07 DIAGNOSIS — Q89.01 HETEROTAXY SYNDROME WITH ASPLENIA: ICD-10-CM

## 2025-08-07 DIAGNOSIS — Z87.74 FONTAN CIRCULATION PRESENT: ICD-10-CM

## 2025-08-07 DIAGNOSIS — Q20.4 SINGLE VENTRICLE WITH UNBALANCED ATRIOVENTRICULAR CANAL: ICD-10-CM

## 2025-08-07 DIAGNOSIS — Q24.9 ADULT CONGENITAL HEART DISEASE: Primary | ICD-10-CM

## 2025-08-07 DIAGNOSIS — Q20.6 HETEROTAXY SYNDROME WITH ASPLENIA: ICD-10-CM

## 2025-08-07 LAB
AMPHETAMINES UR QL SCN: ABNORMAL
ANION GAP SERPL CALCULATED.3IONS-SCNC: 16 MMOL/L (ref 7–15)
AT III ACT/NOR PPP CHRO: 111 % (ref 85–135)
B2 GLYCOPROT1 IGG SERPL IA-ACNC: <0.8 U/ML
B2 GLYCOPROT1 IGM SERPL IA-ACNC: <2.4 U/ML
BARBITURATES UR QL SCN: ABNORMAL
BENZODIAZ UR QL SCN: ABNORMAL
BUN SERPL-MCNC: 16.5 MG/DL (ref 6–20)
BZE UR QL SCN: ABNORMAL
CALCIUM SERPL-MCNC: 9.9 MG/DL (ref 8.8–10.4)
CANNABINOIDS UR QL SCN: ABNORMAL
CARDIOLIPIN IGG SER IA-ACNC: <2 GPL-U/ML
CARDIOLIPIN IGG SER IA-ACNC: NEGATIVE
CARDIOLIPIN IGM SER IA-ACNC: <2 MPL-U/ML
CARDIOLIPIN IGM SER IA-ACNC: NEGATIVE
CHLORIDE SERPL-SCNC: 105 MMOL/L (ref 98–107)
CREAT SERPL-MCNC: 0.96 MG/DL (ref 0.67–1.17)
EGFRCR SERPLBLD CKD-EPI 2021: >90 ML/MIN/1.73M2
ERYTHROCYTE [DISTWIDTH] IN BLOOD BY AUTOMATED COUNT: 15.8 % (ref 10–15)
EST. AVERAGE GLUCOSE BLD GHB EST-MCNC: 126 MG/DL
FACTOR 2 INTERPRETATION: NORMAL
FACTOR V INTERPRETATION: NORMAL
FENTANYL UR QL: ABNORMAL
GLUCOSE BLDC GLUCOMTR-MCNC: 104 MG/DL (ref 70–99)
GLUCOSE BLDC GLUCOMTR-MCNC: 104 MG/DL (ref 70–99)
GLUCOSE BLDC GLUCOMTR-MCNC: 91 MG/DL (ref 70–99)
GLUCOSE SERPL-MCNC: 98 MG/DL (ref 70–99)
HBA1C MFR BLD: 6 %
HCO3 SERPL-SCNC: 17 MMOL/L (ref 22–29)
HCT VFR BLD AUTO: 52.5 % (ref 40–53)
HGB BLD-MCNC: 18.7 G/DL (ref 13.3–17.7)
LAB TESTS NARRATIVE: NORMAL
LABORATORY COMMENT REPORT: NEGATIVE
LABORATORY COMMENT REPORT: NORMAL
LABORATORY COMMENT REPORT: NORMAL
LOCATION OF TASK: NORMAL
MCH RBC QN AUTO: 34.6 PG (ref 26.5–33)
MCHC RBC AUTO-ENTMCNC: 35.6 G/DL (ref 31.5–36.5)
MCV RBC AUTO: 97 FL (ref 78–100)
OPIATES UR QL SCN: ABNORMAL
PATH INTERP SPEC-IMP: NORMAL
PCP QUAL URINE (ROCHE): ABNORMAL
PLATELET # BLD AUTO: 251 10E3/UL (ref 150–450)
POTASSIUM SERPL-SCNC: 4.4 MMOL/L (ref 3.4–5.3)
PROT C ACT/NOR PPP CHRO: 111 % (ref 70–170)
PROT S FREE AG ACT/NOR PPP IA: 126 % (ref 70–148)
RBC # BLD AUTO: 5.41 10E6/UL (ref 4.4–5.9)
SCREEN APTT/NORMAL: 1.06
SCREEN DRVVT/NORMAL: 1 %
SEQUENCING METHOD PNL BLD/T: NORMAL
SEQUENCING METHOD PNL BLD/T: NORMAL
SODIUM SERPL-SCNC: 138 MMOL/L (ref 135–145)
SPECIMEN TYPE: NORMAL
WBC # BLD AUTO: 8.4 10E3/UL (ref 4–11)

## 2025-08-07 PROCEDURE — 36415 COLL VENOUS BLD VENIPUNCTURE: CPT | Performed by: PHYSICIAN ASSISTANT

## 2025-08-07 PROCEDURE — 250N000013 HC RX MED GY IP 250 OP 250 PS 637: Performed by: NURSE PRACTITIONER

## 2025-08-07 PROCEDURE — 81240 F2 GENE: CPT | Performed by: PHYSICIAN ASSISTANT

## 2025-08-07 PROCEDURE — 92610 EVALUATE SWALLOWING FUNCTION: CPT | Mod: GN

## 2025-08-07 PROCEDURE — 80048 BASIC METABOLIC PNL TOTAL CA: CPT | Performed by: PHYSICIAN ASSISTANT

## 2025-08-07 PROCEDURE — 97161 PT EVAL LOW COMPLEX 20 MIN: CPT | Mod: GP

## 2025-08-07 PROCEDURE — 250N000011 HC RX IP 250 OP 636: Performed by: INTERNAL MEDICINE

## 2025-08-07 PROCEDURE — 85018 HEMOGLOBIN: CPT | Performed by: PHYSICIAN ASSISTANT

## 2025-08-07 PROCEDURE — 250N000013 HC RX MED GY IP 250 OP 250 PS 637: Performed by: PHYSICIAN ASSISTANT

## 2025-08-07 PROCEDURE — 120N000001 HC R&B MED SURG/OB

## 2025-08-07 PROCEDURE — 99254 IP/OBS CNSLTJ NEW/EST MOD 60: CPT | Mod: 25 | Performed by: INTERNAL MEDICINE

## 2025-08-07 PROCEDURE — 99233 SBSQ HOSP IP/OBS HIGH 50: CPT | Performed by: INTERNAL MEDICINE

## 2025-08-07 PROCEDURE — 80307 DRUG TEST PRSMV CHEM ANLYZR: CPT | Performed by: NURSE PRACTITIONER

## 2025-08-07 PROCEDURE — 250N000013 HC RX MED GY IP 250 OP 250 PS 637: Performed by: INTERNAL MEDICINE

## 2025-08-07 PROCEDURE — 83036 HEMOGLOBIN GLYCOSYLATED A1C: CPT | Performed by: PHYSICIAN ASSISTANT

## 2025-08-07 PROCEDURE — 92526 ORAL FUNCTION THERAPY: CPT | Mod: GN

## 2025-08-07 RX ORDER — ATORVASTATIN CALCIUM 20 MG/1
20 TABLET, FILM COATED ORAL EVERY EVENING
Status: DISCONTINUED | OUTPATIENT
Start: 2025-08-07 | End: 2025-08-08 | Stop reason: HOSPADM

## 2025-08-07 RX ORDER — ENOXAPARIN SODIUM 100 MG/ML
75 INJECTION SUBCUTANEOUS EVERY 12 HOURS
Status: DISCONTINUED | OUTPATIENT
Start: 2025-08-07 | End: 2025-08-08 | Stop reason: HOSPADM

## 2025-08-07 RX ORDER — WARFARIN SODIUM 2.5 MG/1
7.5 TABLET ORAL
Status: COMPLETED | OUTPATIENT
Start: 2025-08-07 | End: 2025-08-07

## 2025-08-07 RX ADMIN — ASPIRIN 81 MG: 81 TABLET, DELAYED RELEASE ORAL at 08:07

## 2025-08-07 RX ADMIN — ATORVASTATIN CALCIUM 20 MG: 20 TABLET, FILM COATED ORAL at 20:57

## 2025-08-07 RX ADMIN — WARFARIN SODIUM 7.5 MG: 2.5 TABLET ORAL at 18:25

## 2025-08-07 RX ADMIN — CLOPIDOGREL BISULFATE 75 MG: 75 TABLET, FILM COATED ORAL at 08:07

## 2025-08-07 RX ADMIN — ENOXAPARIN SODIUM 75 MG: 80 INJECTION SUBCUTANEOUS at 18:26

## 2025-08-08 ENCOUNTER — APPOINTMENT (OUTPATIENT)
Dept: OCCUPATIONAL THERAPY | Facility: CLINIC | Age: 30
End: 2025-08-08
Attending: PHYSICIAN ASSISTANT
Payer: COMMERCIAL

## 2025-08-08 ENCOUNTER — HOSPITAL ENCOUNTER (INPATIENT)
Dept: CARDIOLOGY | Facility: CLINIC | Age: 30
Discharge: HOME | End: 2025-08-08
Attending: INTERNAL MEDICINE
Payer: COMMERCIAL

## 2025-08-08 ENCOUNTER — ORDERS ONLY (AUTO-RELEASED) (OUTPATIENT)
Dept: MEDSURG UNIT | Facility: CLINIC | Age: 30
End: 2025-08-08
Payer: COMMERCIAL

## 2025-08-08 VITALS
HEIGHT: 70 IN | OXYGEN SATURATION: 94 % | DIASTOLIC BLOOD PRESSURE: 71 MMHG | BODY MASS INDEX: 23.51 KG/M2 | SYSTOLIC BLOOD PRESSURE: 124 MMHG | WEIGHT: 164.24 LBS | HEART RATE: 73 BPM | TEMPERATURE: 98.2 F | RESPIRATION RATE: 18 BRPM

## 2025-08-08 VITALS
BODY MASS INDEX: 23.51 KG/M2 | OXYGEN SATURATION: 95 % | WEIGHT: 164.24 LBS | SYSTOLIC BLOOD PRESSURE: 127 MMHG | TEMPERATURE: 98.2 F | HEIGHT: 70 IN | HEART RATE: 79 BPM | RESPIRATION RATE: 18 BRPM | DIASTOLIC BLOOD PRESSURE: 76 MMHG

## 2025-08-08 DIAGNOSIS — Q89.01 HETEROTAXY SYNDROME WITH ASPLENIA: ICD-10-CM

## 2025-08-08 DIAGNOSIS — Q20.6 HETEROTAXY SYNDROME WITH ASPLENIA: ICD-10-CM

## 2025-08-08 DIAGNOSIS — I63.9 ACUTE CVA (CEREBROVASCULAR ACCIDENT) (H): ICD-10-CM

## 2025-08-08 LAB
HOLD SPECIMEN: NORMAL
HOLD SPECIMEN: NORMAL
INR PPP: 1.06 (ref 0.85–1.15)
PROTHROMBIN TIME: 13.9 SECONDS (ref 11.8–14.8)

## 2025-08-08 PROCEDURE — 97165 OT EVAL LOW COMPLEX 30 MIN: CPT | Mod: GO

## 2025-08-08 PROCEDURE — 97530 THERAPEUTIC ACTIVITIES: CPT | Mod: GO

## 2025-08-08 PROCEDURE — 99239 HOSP IP/OBS DSCHRG MGMT >30: CPT | Performed by: INTERNAL MEDICINE

## 2025-08-08 PROCEDURE — 250N000013 HC RX MED GY IP 250 OP 250 PS 637: Performed by: INTERNAL MEDICINE

## 2025-08-08 PROCEDURE — 93270 REMOTE 30 DAY ECG REV/REPORT: CPT

## 2025-08-08 PROCEDURE — 36415 COLL VENOUS BLD VENIPUNCTURE: CPT | Performed by: INTERNAL MEDICINE

## 2025-08-08 PROCEDURE — 85610 PROTHROMBIN TIME: CPT | Performed by: INTERNAL MEDICINE

## 2025-08-08 RX ORDER — ATORVASTATIN CALCIUM 20 MG/1
20 TABLET, FILM COATED ORAL EVERY EVENING
Qty: 30 TABLET | Refills: 3 | Status: SHIPPED | OUTPATIENT
Start: 2025-08-08

## 2025-08-08 RX ORDER — WARFARIN SODIUM 2.5 MG/1
7.5 TABLET ORAL
Status: DISCONTINUED | OUTPATIENT
Start: 2025-08-08 | End: 2025-08-08 | Stop reason: ALTCHOICE

## 2025-08-08 RX ADMIN — APIXABAN 5 MG: 5 TABLET, FILM COATED ORAL at 09:54

## 2025-08-08 ASSESSMENT — ACTIVITIES OF DAILY LIVING (ADL)
ADLS_ACUITY_SCORE: 35

## 2025-08-11 ENCOUNTER — PATIENT OUTREACH (OUTPATIENT)
Dept: CARE COORDINATION | Facility: CLINIC | Age: 30
End: 2025-08-11
Payer: COMMERCIAL

## 2025-08-13 ENCOUNTER — PATIENT OUTREACH (OUTPATIENT)
Dept: CARE COORDINATION | Facility: CLINIC | Age: 30
End: 2025-08-13
Payer: COMMERCIAL

## 2025-08-17 ENCOUNTER — HEALTH MAINTENANCE LETTER (OUTPATIENT)
Age: 30
End: 2025-08-17

## 2025-08-26 ENCOUNTER — TELEPHONE (OUTPATIENT)
Dept: NEUROLOGY | Facility: CLINIC | Age: 30
End: 2025-08-26
Payer: COMMERCIAL